# Patient Record
Sex: FEMALE | Race: WHITE | Employment: FULL TIME | URBAN - METROPOLITAN AREA
[De-identification: names, ages, dates, MRNs, and addresses within clinical notes are randomized per-mention and may not be internally consistent; named-entity substitution may affect disease eponyms.]

---

## 2017-03-22 ENCOUNTER — PATIENT MESSAGE (OUTPATIENT)
Dept: FAMILY MEDICINE CLINIC | Facility: CLINIC | Age: 37
End: 2017-03-22

## 2017-03-23 NOTE — TELEPHONE ENCOUNTER
Please see above message. Patient still has a valid ultrasound order from 10/4/16. Please advise. Thank you!

## 2017-03-23 NOTE — TELEPHONE ENCOUNTER
Please call this patient and find out more details. How much is she bleeding?  If this is severe, she needs to go to the ER for immediate eval. If it is just a heavy period, then we can refill her naproxen to take as needed for cramps and have her do US aft

## 2017-03-23 NOTE — TELEPHONE ENCOUNTER
From: Darius Botello  To: Socorro Mccollum MD  Sent: 3/22/2017 9:05 AM CDT  Subject: Other    I am hemorrhaging again. I started my menstrual yesterday.  I am nauseous and my anxiety is higher than normal. Cramps were so severe that I couldn't sleep teresa

## 2018-08-31 ENCOUNTER — OFFICE VISIT (OUTPATIENT)
Dept: FAMILY MEDICINE CLINIC | Facility: CLINIC | Age: 38
End: 2018-08-31
Payer: COMMERCIAL

## 2018-08-31 VITALS
BODY MASS INDEX: 30.86 KG/M2 | TEMPERATURE: 99 F | HEIGHT: 66 IN | OXYGEN SATURATION: 99 % | SYSTOLIC BLOOD PRESSURE: 116 MMHG | DIASTOLIC BLOOD PRESSURE: 70 MMHG | WEIGHT: 192 LBS | RESPIRATION RATE: 18 BRPM | HEART RATE: 68 BPM

## 2018-08-31 DIAGNOSIS — Z13.29 SCREENING FOR ENDOCRINE, NUTRITIONAL, METABOLIC AND IMMUNITY DISORDER: ICD-10-CM

## 2018-08-31 DIAGNOSIS — Z13.0 SCREENING FOR ENDOCRINE, NUTRITIONAL, METABOLIC AND IMMUNITY DISORDER: ICD-10-CM

## 2018-08-31 DIAGNOSIS — Z13.228 SCREENING FOR ENDOCRINE, NUTRITIONAL, METABOLIC AND IMMUNITY DISORDER: ICD-10-CM

## 2018-08-31 DIAGNOSIS — Z00.00 ROUTINE GENERAL MEDICAL EXAMINATION AT A HEALTH CARE FACILITY: Primary | ICD-10-CM

## 2018-08-31 DIAGNOSIS — Z13.21 SCREENING FOR ENDOCRINE, NUTRITIONAL, METABOLIC AND IMMUNITY DISORDER: ICD-10-CM

## 2018-08-31 DIAGNOSIS — F41.8 ANXIETY ASSOCIATED WITH DEPRESSION: ICD-10-CM

## 2018-08-31 PROCEDURE — 99395 PREV VISIT EST AGE 18-39: CPT | Performed by: FAMILY MEDICINE

## 2018-08-31 RX ORDER — CITALOPRAM 10 MG/1
10 TABLET ORAL DAILY
Qty: 90 TABLET | Refills: 3 | Status: SHIPPED | OUTPATIENT
Start: 2018-08-31 | End: 2019-01-17 | Stop reason: ALTCHOICE

## 2018-08-31 NOTE — PROGRESS NOTES
Kwesi Benson is a 40year old female who presents for a complete physical exam, no gyn. HPI:     Patient presents with:  Physical      Patient feels well, dental visit up to date, no hearing problem. Vaccinations up to date.     Exercise: walking, or motor complaint  HEMATOLOGY: denies hx anemia; denies bruising or excessive bleeding  ENDOCRINE: denies excessive thirst or urination; denies unexpected wt gain or wt loss  ALLERGY/IMM.: denies food or seasonal allergies  PSYCH: no symptoms of depressio plan.  The patient is asked to return for CPX in 1 year.

## 2018-08-31 NOTE — PATIENT INSTRUCTIONS
Prevention Guidelines, Women Ages 25 to 44  Screening tests and vaccines are an important part of managing your health. A screening test is done to find possible disorders or diseases in people who don't have any symptoms.  The goal is to find a disease e Type 2 diabetes All women with prediabetes Every year   Gonorrhea Sexually active women at increased risk for infection At routine exams   Hepatitis C Anyone at increased risk At routine exams   HIV All women should be tested at least once for HIV between Meningococcal Women at increased risk for infection should talk with their healthcare provider 1 or more doses   Pneumococcal conjugate vaccine (PCV13) and pneumococcal polysaccharide vaccine (PPSV23) Women at increased risk for infection should talk with © 0227-0487 [employer] [address]. [address] [facility] [place], [facility] [place]. All rights reserved. This information is not intended as a substitute for professional medical care. Always follow your healthcare professional's instructions.

## 2018-09-27 ENCOUNTER — LAB ENCOUNTER (OUTPATIENT)
Dept: LAB | Age: 38
End: 2018-09-27
Attending: FAMILY MEDICINE
Payer: COMMERCIAL

## 2018-09-27 DIAGNOSIS — Z13.21 SCREENING FOR ENDOCRINE, NUTRITIONAL, METABOLIC AND IMMUNITY DISORDER: ICD-10-CM

## 2018-09-27 DIAGNOSIS — Z00.00 ROUTINE GENERAL MEDICAL EXAMINATION AT A HEALTH CARE FACILITY: ICD-10-CM

## 2018-09-27 DIAGNOSIS — Z13.0 SCREENING FOR ENDOCRINE, NUTRITIONAL, METABOLIC AND IMMUNITY DISORDER: ICD-10-CM

## 2018-09-27 DIAGNOSIS — Z13.228 SCREENING FOR ENDOCRINE, NUTRITIONAL, METABOLIC AND IMMUNITY DISORDER: ICD-10-CM

## 2018-09-27 DIAGNOSIS — Z13.29 SCREENING FOR ENDOCRINE, NUTRITIONAL, METABOLIC AND IMMUNITY DISORDER: ICD-10-CM

## 2018-09-27 LAB
ALBUMIN SERPL-MCNC: 3.8 G/DL (ref 3.5–4.8)
ALBUMIN/GLOB SERPL: 1 {RATIO} (ref 1–2)
ALP LIVER SERPL-CCNC: 58 U/L (ref 37–98)
ALT SERPL-CCNC: 31 U/L (ref 14–54)
ANION GAP SERPL CALC-SCNC: 5 MMOL/L (ref 0–18)
AST SERPL-CCNC: 21 U/L (ref 15–41)
BASOPHILS # BLD AUTO: 0.06 X10(3) UL (ref 0–0.1)
BASOPHILS NFR BLD AUTO: 0.9 %
BILIRUB SERPL-MCNC: 0.6 MG/DL (ref 0.1–2)
BUN BLD-MCNC: 14 MG/DL (ref 8–20)
BUN/CREAT SERPL: 21.9 (ref 10–20)
CALCIUM BLD-MCNC: 8.8 MG/DL (ref 8.3–10.3)
CHLORIDE SERPL-SCNC: 105 MMOL/L (ref 101–111)
CHOLEST SMN-MCNC: 163 MG/DL (ref ?–200)
CO2 SERPL-SCNC: 28 MMOL/L (ref 22–32)
CREAT BLD-MCNC: 0.64 MG/DL (ref 0.55–1.02)
EOSINOPHIL # BLD AUTO: 0.36 X10(3) UL (ref 0–0.3)
EOSINOPHIL NFR BLD AUTO: 5.5 %
ERYTHROCYTE [DISTWIDTH] IN BLOOD BY AUTOMATED COUNT: 14.2 % (ref 11.5–16)
GLOBULIN PLAS-MCNC: 4 G/DL (ref 2.5–4)
GLUCOSE BLD-MCNC: 87 MG/DL (ref 70–99)
HCT VFR BLD AUTO: 35.3 % (ref 34–50)
HDLC SERPL-MCNC: 60 MG/DL (ref 40–59)
HGB BLD-MCNC: 10.9 G/DL (ref 12–16)
IMMATURE GRANULOCYTE COUNT: 0.01 X10(3) UL (ref 0–1)
IMMATURE GRANULOCYTE RATIO %: 0.2 %
LDLC SERPL CALC-MCNC: 93 MG/DL (ref ?–100)
LYMPHOCYTES # BLD AUTO: 2.01 X10(3) UL (ref 0.9–4)
LYMPHOCYTES NFR BLD AUTO: 30.5 %
M PROTEIN MFR SERPL ELPH: 7.8 G/DL (ref 6.1–8.3)
MCH RBC QN AUTO: 26.3 PG (ref 27–33.2)
MCHC RBC AUTO-ENTMCNC: 30.9 G/DL (ref 31–37)
MCV RBC AUTO: 85.1 FL (ref 81–100)
MONOCYTES # BLD AUTO: 0.52 X10(3) UL (ref 0.1–1)
MONOCYTES NFR BLD AUTO: 7.9 %
NEUTROPHIL ABS PRELIM: 3.64 X10 (3) UL (ref 1.3–6.7)
NEUTROPHILS # BLD AUTO: 3.64 X10(3) UL (ref 1.3–6.7)
NEUTROPHILS NFR BLD AUTO: 55 %
NONHDLC SERPL-MCNC: 103 MG/DL (ref ?–130)
OSMOLALITY SERPL CALC.SUM OF ELEC: 286 MOSM/KG (ref 275–295)
PLATELET # BLD AUTO: 433 10(3)UL (ref 150–450)
POTASSIUM SERPL-SCNC: 4.1 MMOL/L (ref 3.6–5.1)
RBC # BLD AUTO: 4.15 X10(6)UL (ref 3.8–5.1)
RED CELL DISTRIBUTION WIDTH-SD: 44.2 FL (ref 35.1–46.3)
SODIUM SERPL-SCNC: 138 MMOL/L (ref 136–144)
TRIGL SERPL-MCNC: 52 MG/DL (ref 30–149)
TSI SER-ACNC: 0.59 MIU/ML (ref 0.35–5.5)
VLDLC SERPL CALC-MCNC: 10 MG/DL (ref 0–30)
WBC # BLD AUTO: 6.6 X10(3) UL (ref 4–13)

## 2018-09-27 PROCEDURE — 80050 GENERAL HEALTH PANEL: CPT | Performed by: FAMILY MEDICINE

## 2018-09-27 PROCEDURE — 36415 COLL VENOUS BLD VENIPUNCTURE: CPT | Performed by: FAMILY MEDICINE

## 2018-09-27 PROCEDURE — 80061 LIPID PANEL: CPT | Performed by: FAMILY MEDICINE

## 2018-09-28 ENCOUNTER — TELEPHONE (OUTPATIENT)
Dept: FAMILY MEDICINE CLINIC | Facility: CLINIC | Age: 38
End: 2018-09-28

## 2018-09-28 DIAGNOSIS — N92.0 MENORRHAGIA WITH REGULAR CYCLE: Primary | ICD-10-CM

## 2018-09-28 NOTE — TELEPHONE ENCOUNTER
Patient aware of US and lab orders. She is requesting HIV lab test only. Advised to have blood work done in AM. She will follow up after testing is complete. All orders placed.

## 2018-09-28 NOTE — TELEPHONE ENCOUNTER
----- Message from Shwetha Davies MD sent at 9/28/2018 12:04 PM CDT -----  Anemia, heavy periods?  If not we need fit test.

## 2018-09-28 NOTE — TELEPHONE ENCOUNTER
Spoke with patient. She says her period is generally pretty light, however, she 1 day where it is very heavy-so heavy that she has to change her pants. This happens every period and has been going on for a couple of years.  She brought this up before with A

## 2018-09-28 NOTE — TELEPHONE ENCOUNTER
FSH,LH, progestin morning labs. US gyn for menorrhagia. Then will f/u. Thanks. Ok for HIV or all STD panel ask if she is interested for all STD panel.

## 2018-10-25 ENCOUNTER — APPOINTMENT (OUTPATIENT)
Dept: LAB | Age: 38
End: 2018-10-25
Attending: FAMILY MEDICINE
Payer: COMMERCIAL

## 2018-10-25 DIAGNOSIS — N92.0 MENORRHAGIA WITH REGULAR CYCLE: ICD-10-CM

## 2018-10-25 PROCEDURE — 36415 COLL VENOUS BLD VENIPUNCTURE: CPT | Performed by: FAMILY MEDICINE

## 2018-10-25 PROCEDURE — 83002 ASSAY OF GONADOTROPIN (LH): CPT | Performed by: FAMILY MEDICINE

## 2018-10-25 PROCEDURE — 84144 ASSAY OF PROGESTERONE: CPT | Performed by: FAMILY MEDICINE

## 2018-10-25 PROCEDURE — 83001 ASSAY OF GONADOTROPIN (FSH): CPT | Performed by: FAMILY MEDICINE

## 2018-10-25 PROCEDURE — 87389 HIV-1 AG W/HIV-1&-2 AB AG IA: CPT | Performed by: FAMILY MEDICINE

## 2018-10-26 ENCOUNTER — PATIENT MESSAGE (OUTPATIENT)
Dept: FAMILY MEDICINE CLINIC | Facility: CLINIC | Age: 38
End: 2018-10-26

## 2018-10-29 NOTE — TELEPHONE ENCOUNTER
From: Wen Botello  To: Nohemy Cervantes MD  Sent: 10/26/2018 5:28 PM CDT  Subject: Test Results Question    Which test is the results for the HIV test I requested? ?

## 2019-01-10 ENCOUNTER — HOSPITAL ENCOUNTER (OUTPATIENT)
Dept: ULTRASOUND IMAGING | Age: 39
Discharge: HOME | End: 2019-01-10
Attending: FAMILY MEDICINE
Payer: COMMERCIAL

## 2019-01-10 DIAGNOSIS — N92.0 MENORRHAGIA WITH REGULAR CYCLE: ICD-10-CM

## 2019-01-10 PROCEDURE — 76830 TRANSVAGINAL US NON-OB: CPT | Performed by: FAMILY MEDICINE

## 2019-01-10 PROCEDURE — 76856 US EXAM PELVIC COMPLETE: CPT | Performed by: FAMILY MEDICINE

## 2019-01-11 ENCOUNTER — TELEPHONE (OUTPATIENT)
Dept: FAMILY MEDICINE CLINIC | Facility: CLINIC | Age: 39
End: 2019-01-11

## 2019-01-11 NOTE — TELEPHONE ENCOUNTER
----- Message from Rashida Magallanes MD sent at 1/10/2019  7:54 PM CST -----  US PELVIS EV (TRNS ABD AND ENDOVAG)   Ovarian cyst, ok to f/u wit me for further discussion.

## 2019-01-11 NOTE — TELEPHONE ENCOUNTER
Spoke with pt regarding results and instructions listed below. Pt verbalizes understanding. FYI: US F/U scheduled with PCP 1/17/2019. No significant past surgical history

## 2019-01-17 ENCOUNTER — OFFICE VISIT (OUTPATIENT)
Dept: FAMILY MEDICINE CLINIC | Facility: CLINIC | Age: 39
End: 2019-01-17
Payer: COMMERCIAL

## 2019-01-17 VITALS
RESPIRATION RATE: 20 BRPM | BODY MASS INDEX: 29.89 KG/M2 | SYSTOLIC BLOOD PRESSURE: 116 MMHG | TEMPERATURE: 98 F | HEART RATE: 80 BPM | DIASTOLIC BLOOD PRESSURE: 70 MMHG | WEIGHT: 186 LBS | HEIGHT: 66 IN | OXYGEN SATURATION: 99 %

## 2019-01-17 DIAGNOSIS — L70.0 ACNE VULGARIS: ICD-10-CM

## 2019-01-17 DIAGNOSIS — Z23 NEED FOR INFLUENZA VACCINATION: Primary | ICD-10-CM

## 2019-01-17 DIAGNOSIS — N83.202 CYST OF LEFT OVARY: ICD-10-CM

## 2019-01-17 PROCEDURE — 99214 OFFICE O/P EST MOD 30 MIN: CPT | Performed by: FAMILY MEDICINE

## 2019-01-17 PROCEDURE — 90471 IMMUNIZATION ADMIN: CPT | Performed by: FAMILY MEDICINE

## 2019-01-17 PROCEDURE — 90686 IIV4 VACC NO PRSV 0.5 ML IM: CPT | Performed by: FAMILY MEDICINE

## 2019-01-17 RX ORDER — LEVONORGESTREL / ETHINYL ESTRADIOL AND ETHINYL ESTRADIOL 150-30(84)
1 KIT ORAL DAILY
Qty: 91 TABLET | Refills: 3 | Status: SHIPPED | OUTPATIENT
Start: 2019-01-17 | End: 2019-04-18

## 2019-01-17 RX ORDER — CLINDAMYCIN PHOSPHATE 10 MG/G
1 GEL TOPICAL 2 TIMES DAILY
Qty: 45 G | Refills: 3 | Status: SHIPPED | OUTPATIENT
Start: 2019-01-17 | End: 2019-04-18 | Stop reason: ALTCHOICE

## 2019-01-17 NOTE — PROGRESS NOTES
Stanley Lamas is a 41year old female who presents for abdominal pain. HPI:  The patient complaints of pelvic pain, dull, worse around the period, no radiation, come and go, mild to moderate, s/p tubal ligation, some acne, no h./o PCOS.  worsened b • Other (Other) Maternal Grandfather    • Other (liver dz) Maternal Grandfather       Social History:  Social History    Tobacco Use      Smoking status: Never Smoker      Smokeless tobacco: Never Used    Alcohol use: No    Drug use: No        REVIEW OF the plan. F/u in 3 months.

## 2019-04-18 ENCOUNTER — OFFICE VISIT (OUTPATIENT)
Dept: FAMILY MEDICINE CLINIC | Facility: CLINIC | Age: 39
End: 2019-04-18
Payer: COMMERCIAL

## 2019-04-18 VITALS
RESPIRATION RATE: 20 BRPM | HEART RATE: 68 BPM | TEMPERATURE: 99 F | DIASTOLIC BLOOD PRESSURE: 68 MMHG | SYSTOLIC BLOOD PRESSURE: 114 MMHG | OXYGEN SATURATION: 99 % | HEIGHT: 66 IN | WEIGHT: 186 LBS | BODY MASS INDEX: 29.89 KG/M2

## 2019-04-18 DIAGNOSIS — N93.8 DUB (DYSFUNCTIONAL UTERINE BLEEDING): Primary | ICD-10-CM

## 2019-04-18 PROCEDURE — 99214 OFFICE O/P EST MOD 30 MIN: CPT | Performed by: FAMILY MEDICINE

## 2019-04-18 NOTE — PATIENT INSTRUCTIONS
Kanchan Goodman MD  Obstetrics/Gynecology     [address] [facility]  Phone: [phone]  Fax: [phone] [address]  Phone: [phone]  Fax: [phone]

## 2019-04-18 NOTE — PROGRESS NOTES
Lisa Tomlinson is a 41year old female who presents for abnormal uterine bleeding. HPI:  The patient complaints of pelvic pain, dull, worse around the period, no radiation, come and go, mild to moderate, s/p tubal ligation, some acne, no h./o PCOS. History:  Social History    Tobacco Use      Smoking status: Never Smoker      Smokeless tobacco: Never Used    Alcohol use: No    Drug use: No        REVIEW OF SYSTEMS:   GENERAL: feels tired, no lethargy, no fever, no chills  SKIN: denies any unusual ski

## 2019-06-17 ENCOUNTER — TELEPHONE (OUTPATIENT)
Dept: FAMILY MEDICINE CLINIC | Facility: CLINIC | Age: 39
End: 2019-06-17

## 2019-06-17 NOTE — TELEPHONE ENCOUNTER
Pt states that the ob gyn that was referred is book out and then going on maternity leave. She would like another ob gyn.

## 2019-06-17 NOTE — TELEPHONE ENCOUNTER
Dr. Berta Guerra. [address] [facility] [address]  [place], [address]  [phone]    [address] [facility]  [address]  [place], [id number]  Phone: [phone] [place] [address] [employer], [address]

## 2019-06-17 NOTE — TELEPHONE ENCOUNTER
Called patient. I offered her referral but she is wanting female only. I gave her contact info for  and JOHAN OBLILY.

## 2019-06-17 NOTE — TELEPHONE ENCOUNTER
Pt is asking for alternate referral for OB/GYN, Dr. Neeta Deras is going out on maternity leave.   LOV 4/18

## 2019-12-19 ENCOUNTER — LAB ENCOUNTER (OUTPATIENT)
Dept: LAB | Age: 39
End: 2019-12-19
Attending: FAMILY MEDICINE
Payer: COMMERCIAL

## 2019-12-19 ENCOUNTER — OFFICE VISIT (OUTPATIENT)
Dept: FAMILY MEDICINE CLINIC | Facility: CLINIC | Age: 39
End: 2019-12-19
Payer: COMMERCIAL

## 2019-12-19 VITALS
BODY MASS INDEX: 28.61 KG/M2 | HEART RATE: 52 BPM | OXYGEN SATURATION: 98 % | HEIGHT: 66 IN | WEIGHT: 178 LBS | TEMPERATURE: 98 F | DIASTOLIC BLOOD PRESSURE: 58 MMHG | SYSTOLIC BLOOD PRESSURE: 106 MMHG | RESPIRATION RATE: 18 BRPM

## 2019-12-19 DIAGNOSIS — Z13.228 SCREENING FOR ENDOCRINE, NUTRITIONAL, METABOLIC AND IMMUNITY DISORDER: ICD-10-CM

## 2019-12-19 DIAGNOSIS — Z13.29 SCREENING FOR ENDOCRINE, NUTRITIONAL, METABOLIC AND IMMUNITY DISORDER: ICD-10-CM

## 2019-12-19 DIAGNOSIS — Z11.3 SCREENING EXAMINATION FOR STD (SEXUALLY TRANSMITTED DISEASE): ICD-10-CM

## 2019-12-19 DIAGNOSIS — Z01.419 WELL WOMAN EXAM WITH ROUTINE GYNECOLOGICAL EXAM: Primary | ICD-10-CM

## 2019-12-19 DIAGNOSIS — Z11.8 SCREENING FOR CHLAMYDIAL DISEASE: ICD-10-CM

## 2019-12-19 DIAGNOSIS — Z13.21 SCREENING FOR ENDOCRINE, NUTRITIONAL, METABOLIC AND IMMUNITY DISORDER: ICD-10-CM

## 2019-12-19 DIAGNOSIS — Z13.0 SCREENING FOR ENDOCRINE, NUTRITIONAL, METABOLIC AND IMMUNITY DISORDER: ICD-10-CM

## 2019-12-19 DIAGNOSIS — Z12.4 SCREENING FOR CERVICAL CANCER: ICD-10-CM

## 2019-12-19 PROCEDURE — 86780 TREPONEMA PALLIDUM: CPT | Performed by: FAMILY MEDICINE

## 2019-12-19 PROCEDURE — 86704 HEP B CORE ANTIBODY TOTAL: CPT | Performed by: FAMILY MEDICINE

## 2019-12-19 PROCEDURE — 84439 ASSAY OF FREE THYROXINE: CPT | Performed by: FAMILY MEDICINE

## 2019-12-19 PROCEDURE — 87591 N.GONORRHOEAE DNA AMP PROB: CPT | Performed by: FAMILY MEDICINE

## 2019-12-19 PROCEDURE — 87624 HPV HI-RISK TYP POOLED RSLT: CPT | Performed by: FAMILY MEDICINE

## 2019-12-19 PROCEDURE — 99395 PREV VISIT EST AGE 18-39: CPT | Performed by: FAMILY MEDICINE

## 2019-12-19 PROCEDURE — 80050 GENERAL HEALTH PANEL: CPT | Performed by: FAMILY MEDICINE

## 2019-12-19 PROCEDURE — 86706 HEP B SURFACE ANTIBODY: CPT | Performed by: FAMILY MEDICINE

## 2019-12-19 PROCEDURE — 36415 COLL VENOUS BLD VENIPUNCTURE: CPT | Performed by: FAMILY MEDICINE

## 2019-12-19 PROCEDURE — 87389 HIV-1 AG W/HIV-1&-2 AB AG IA: CPT | Performed by: FAMILY MEDICINE

## 2019-12-19 PROCEDURE — 86803 HEPATITIS C AB TEST: CPT | Performed by: FAMILY MEDICINE

## 2019-12-19 PROCEDURE — 86695 HERPES SIMPLEX TYPE 1 TEST: CPT | Performed by: FAMILY MEDICINE

## 2019-12-19 PROCEDURE — 82306 VITAMIN D 25 HYDROXY: CPT | Performed by: FAMILY MEDICINE

## 2019-12-19 PROCEDURE — 87491 CHLMYD TRACH DNA AMP PROBE: CPT | Performed by: FAMILY MEDICINE

## 2019-12-19 PROCEDURE — 87340 HEPATITIS B SURFACE AG IA: CPT | Performed by: FAMILY MEDICINE

## 2019-12-19 NOTE — PROGRESS NOTES
Larri Habermann is a 46year old female who presents for a complete physical exam.   HPI:     Patient presents with:  Physical      Patient feels well, dental visit up to date, no hearing problem. Vaccinations up to date.   [id number], natural delivery t • Other (anxiety) Mother    • Other (depression) Mother    • Heart Disorder Maternal Grandmother    • Other (Other) Maternal Grandfather    • Other (liver dz) Maternal Grandfather       Social History    Tobacco Use      Smoking status: Never Smoker placed and vaginal wall visualizes without abnormalities and Liquid Based PAP performed. Cervix is normal, non-friable, with a closed OS and no abnormal D/C. Bimanual exam shows no CMT or adenexal masses or tenderness.   Rectal exam: has normal tone, no ma

## 2019-12-23 ENCOUNTER — TELEPHONE (OUTPATIENT)
Dept: FAMILY MEDICINE CLINIC | Facility: CLINIC | Age: 39
End: 2019-12-23

## 2019-12-23 ENCOUNTER — PATIENT MESSAGE (OUTPATIENT)
Dept: FAMILY MEDICINE CLINIC | Facility: CLINIC | Age: 39
End: 2019-12-23

## 2019-12-23 RX ORDER — ERGOCALCIFEROL 1.25 MG/1
CAPSULE ORAL
Qty: 4 CAPSULE | Refills: 3 | Status: SHIPPED | OUTPATIENT
Start: 2019-12-23 | End: 2020-03-07 | Stop reason: ALTCHOICE

## 2019-12-23 NOTE — TELEPHONE ENCOUNTER
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

## 2019-12-23 NOTE — TELEPHONE ENCOUNTER
----- Message from Gordy Morrison MD sent at 12/20/2019 10:38 AM CST -----  Needs hep B series. 1-1-6. Not immune. Low vit. D, Rx 32831G for 4 months, weekly please. lmom for pt with results/instructions.   Asked her to call office to schedule the nurs

## 2019-12-26 ENCOUNTER — TELEPHONE (OUTPATIENT)
Dept: FAMILY MEDICINE CLINIC | Facility: CLINIC | Age: 39
End: 2019-12-26

## 2019-12-26 RX ORDER — CLINDAMYCIN PHOSPHATE 20 MG/G
CREAM VAGINAL
Qty: 40 G | Refills: 0 | Status: SHIPPED | OUTPATIENT
Start: 2019-12-26 | End: 2020-03-07 | Stop reason: ALTCHOICE

## 2019-12-26 NOTE — TELEPHONE ENCOUNTER
----- Message from Katie Amos MD sent at 12/26/2019  8:10 AM CST -----  Please send Clindamycin 2% PV for 5 nights one applications for positive BV. Normal Pap.     Spoke to pt with results/instructions and med sent to rx

## 2020-03-04 ENCOUNTER — NURSE ONLY (OUTPATIENT)
Dept: FAMILY MEDICINE CLINIC | Facility: CLINIC | Age: 40
End: 2020-03-04
Payer: COMMERCIAL

## 2020-03-04 DIAGNOSIS — Z23 NEED FOR HEPATITIS B VACCINATION: Primary | ICD-10-CM

## 2020-03-04 PROCEDURE — 90471 IMMUNIZATION ADMIN: CPT | Performed by: NURSE PRACTITIONER

## 2020-03-04 PROCEDURE — 90746 HEPB VACCINE 3 DOSE ADULT IM: CPT | Performed by: NURSE PRACTITIONER

## 2020-03-05 ENCOUNTER — PATIENT MESSAGE (OUTPATIENT)
Dept: FAMILY MEDICINE CLINIC | Facility: CLINIC | Age: 40
End: 2020-03-05

## 2020-03-06 NOTE — TELEPHONE ENCOUNTER
From: Hayes Botello  To: Cadence Carpenter MD  Sent: 3/5/2020 8:53 PM CST  Subject: Non-Urgent Medical Question    On Tuesday March 3, 2020 I had a blood clot and thought my period was starting. Another blood clot came out before bed.  Yesterday March 4,

## 2020-03-07 ENCOUNTER — LAB ENCOUNTER (OUTPATIENT)
Dept: LAB | Age: 40
End: 2020-03-07
Attending: FAMILY MEDICINE
Payer: COMMERCIAL

## 2020-03-07 ENCOUNTER — OFFICE VISIT (OUTPATIENT)
Dept: FAMILY MEDICINE CLINIC | Facility: CLINIC | Age: 40
End: 2020-03-07
Payer: COMMERCIAL

## 2020-03-07 VITALS
DIASTOLIC BLOOD PRESSURE: 68 MMHG | TEMPERATURE: 99 F | HEIGHT: 66 IN | OXYGEN SATURATION: 100 % | BODY MASS INDEX: 28.61 KG/M2 | WEIGHT: 178 LBS | HEART RATE: 62 BPM | RESPIRATION RATE: 18 BRPM | SYSTOLIC BLOOD PRESSURE: 112 MMHG

## 2020-03-07 DIAGNOSIS — Z13.228 SCREENING FOR ENDOCRINE, NUTRITIONAL, METABOLIC AND IMMUNITY DISORDER: ICD-10-CM

## 2020-03-07 DIAGNOSIS — Z13.21 SCREENING FOR ENDOCRINE, NUTRITIONAL, METABOLIC AND IMMUNITY DISORDER: ICD-10-CM

## 2020-03-07 DIAGNOSIS — Z13.29 SCREENING FOR ENDOCRINE, NUTRITIONAL, METABOLIC AND IMMUNITY DISORDER: ICD-10-CM

## 2020-03-07 DIAGNOSIS — N92.0 MENORRHAGIA WITH REGULAR CYCLE: ICD-10-CM

## 2020-03-07 DIAGNOSIS — Z13.0 SCREENING FOR ENDOCRINE, NUTRITIONAL, METABOLIC AND IMMUNITY DISORDER: ICD-10-CM

## 2020-03-07 DIAGNOSIS — N92.0 MENORRHAGIA WITH REGULAR CYCLE: Primary | ICD-10-CM

## 2020-03-07 LAB
B-HCG SERPL-ACNC: <1 MIU/ML
BASOPHILS # BLD AUTO: 0.06 X10(3) UL (ref 0–0.2)
BASOPHILS NFR BLD AUTO: 0.9 %
CHOLEST SMN-MCNC: 173 MG/DL (ref ?–200)
DEPRECATED RDW RBC AUTO: 43.8 FL (ref 35.1–46.3)
EOSINOPHIL # BLD AUTO: 0.37 X10(3) UL (ref 0–0.7)
EOSINOPHIL NFR BLD AUTO: 5.8 %
ERYTHROCYTE [DISTWIDTH] IN BLOOD BY AUTOMATED COUNT: 14.3 % (ref 11–15)
FSH SERPL-ACNC: 5 MIU/ML
HCT VFR BLD AUTO: 34.4 % (ref 35–48)
HDLC SERPL-MCNC: 79 MG/DL (ref 40–59)
HGB BLD-MCNC: 10.4 G/DL (ref 12–16)
IMM GRANULOCYTES # BLD AUTO: 0.01 X10(3) UL (ref 0–1)
IMM GRANULOCYTES NFR BLD: 0.2 %
LDLC SERPL CALC-MCNC: 85 MG/DL (ref ?–100)
LH SERPL-ACNC: 4.1 MIU/ML
LYMPHOCYTES # BLD AUTO: 1.88 X10(3) UL (ref 1–4)
LYMPHOCYTES NFR BLD AUTO: 29.5 %
MCH RBC QN AUTO: 25.3 PG (ref 26–34)
MCHC RBC AUTO-ENTMCNC: 30.2 G/DL (ref 31–37)
MCV RBC AUTO: 83.7 FL (ref 80–100)
MONOCYTES # BLD AUTO: 0.64 X10(3) UL (ref 0.1–1)
MONOCYTES NFR BLD AUTO: 10 %
NEUTROPHILS # BLD AUTO: 3.41 X10 (3) UL (ref 1.5–7.7)
NEUTROPHILS # BLD AUTO: 3.41 X10(3) UL (ref 1.5–7.7)
NEUTROPHILS NFR BLD AUTO: 53.6 %
NONHDLC SERPL-MCNC: 94 MG/DL (ref ?–130)
PATIENT FASTING Y/N/NP: YES
PLATELET # BLD AUTO: 385 10(3)UL (ref 150–450)
RBC # BLD AUTO: 4.11 X10(6)UL (ref 3.8–5.3)
TRIGL SERPL-MCNC: 46 MG/DL (ref 30–149)
TSI SER-ACNC: 0.88 MIU/ML (ref 0.36–3.74)
VLDLC SERPL CALC-MCNC: 9 MG/DL (ref 0–30)
WBC # BLD AUTO: 6.4 X10(3) UL (ref 4–11)

## 2020-03-07 PROCEDURE — 83002 ASSAY OF GONADOTROPIN (LH): CPT | Performed by: FAMILY MEDICINE

## 2020-03-07 PROCEDURE — 83001 ASSAY OF GONADOTROPIN (FSH): CPT | Performed by: FAMILY MEDICINE

## 2020-03-07 PROCEDURE — 36415 COLL VENOUS BLD VENIPUNCTURE: CPT | Performed by: FAMILY MEDICINE

## 2020-03-07 PROCEDURE — 84443 ASSAY THYROID STIM HORMONE: CPT | Performed by: FAMILY MEDICINE

## 2020-03-07 PROCEDURE — 85025 COMPLETE CBC W/AUTO DIFF WBC: CPT | Performed by: FAMILY MEDICINE

## 2020-03-07 PROCEDURE — 84702 CHORIONIC GONADOTROPIN TEST: CPT | Performed by: FAMILY MEDICINE

## 2020-03-07 PROCEDURE — 80061 LIPID PANEL: CPT | Performed by: FAMILY MEDICINE

## 2020-03-07 PROCEDURE — 99214 OFFICE O/P EST MOD 30 MIN: CPT | Performed by: FAMILY MEDICINE

## 2020-03-07 NOTE — PROGRESS NOTES
Sakshi Reyes is a 46year old female who presents for abnormal uterine bleeding. HPI:  The patient complaints of abnormal uterine bleeding. Pt has prolonged periods for 1 cycle. Started suddenly. Worsened by activites. Better by rest. No prior pro No        REVIEW OF SYSTEMS:   GENERAL: feels tired, no lethargy, no fever, no chills  SKIN: denies any unusual skin lesions, rash. LUNGS: denies shortness of breath with exertion  CARDIOVASCULAR: denies chest pain on exertion  GI: as above. No heartburn. Yue Gold

## 2020-03-07 NOTE — PATIENT INSTRUCTIONS
Heavy Menstrual Bleeding    Heavy menstrual bleeding means that your periods are heavier or longer than usual. You may soak through a pad or tampon every 1 to 3 hours on the heaviest days of your period. You may also pass large, dark clots. And your alverto · Heavier bleeding (soaking 1 pad or tampon every hour for 3 hours)  · Heavy bleeding that lasts longer than 1 week  · Fever of 100.4ºF (38ºC) or higher, or as directed by your provider  · Pain or cramping that gets worse instead of better  · Signs of anem Other causes of uterine bleeding  There are other causes of uterine bleeding. These include:  · IUD (intrauterine device). This is a method of birth control. Some IUDs contain hormones. · Bleeding disorders. This is when the blood can't clot properly.   Denny Vargas

## 2020-03-11 ENCOUNTER — HOSPITAL ENCOUNTER (OUTPATIENT)
Dept: ULTRASOUND IMAGING | Age: 40
Discharge: HOME | End: 2020-03-11
Attending: FAMILY MEDICINE
Payer: COMMERCIAL

## 2020-03-11 DIAGNOSIS — N92.0 MENORRHAGIA WITH REGULAR CYCLE: ICD-10-CM

## 2020-03-11 PROCEDURE — 76830 TRANSVAGINAL US NON-OB: CPT | Performed by: FAMILY MEDICINE

## 2020-03-11 PROCEDURE — 76856 US EXAM PELVIC COMPLETE: CPT | Performed by: FAMILY MEDICINE

## 2020-03-17 NOTE — PROGRESS NOTES
[facility]  Obstetrics and Gynecology Referral  History & Physical    CC: Patient is a new patient and referred for hx of ovarian cyst and nabothian cyst    Subjective:     HPI: Jonathan Pérez is a 46year old  female referred for hx of Not on file    Social Needs      Financial resource strain: Not on file      Food insecurity:        Worry: Not on file        Inability: Not on file      Transportation needs:        Medical: Not on file        Non-medical: Not on file    Tobacco Use Disorder Maternal Grandmother    • Other (Other) Maternal Grandfather    • Other (liver dz) Maternal Grandfather        Review of Systems:  General: denies fevers, chills, fatigue and malaise  Respiratory:  denies SOB, dyspnea, cough or wheezing  Cardiovas 01/2019  FINDINGS:             TRANSABDOMINAL ULTRASOUND:  UTERUS: The uterus measures 10.4 x 6.3 x 4.2 cm. The endometrium appears unremarkable on transabdominal images. Complex nabothian cyst.  RIGHT OVARY: Right ovary measures 3.2 x 1.3 x 1.6 cm.    LEF

## 2020-03-18 ENCOUNTER — OFFICE VISIT (OUTPATIENT)
Dept: OBGYN CLINIC | Facility: CLINIC | Age: 40
End: 2020-03-18
Payer: COMMERCIAL

## 2020-03-18 VITALS
BODY MASS INDEX: 30.02 KG/M2 | WEIGHT: 186.81 LBS | DIASTOLIC BLOOD PRESSURE: 70 MMHG | SYSTOLIC BLOOD PRESSURE: 122 MMHG | HEIGHT: 66 IN | HEART RATE: 58 BPM

## 2020-03-18 DIAGNOSIS — N88.8 NABOTHIAN CYST: Primary | ICD-10-CM

## 2020-03-18 PROCEDURE — 99203 OFFICE O/P NEW LOW 30 MIN: CPT | Performed by: OBSTETRICS & GYNECOLOGY

## 2020-03-19 PROBLEM — N88.8 NABOTHIAN CYST: Status: ACTIVE | Noted: 2020-03-19

## 2020-11-05 ENCOUNTER — OFFICE VISIT (OUTPATIENT)
Dept: FAMILY MEDICINE CLINIC | Facility: CLINIC | Age: 40
End: 2020-11-05
Payer: COMMERCIAL

## 2020-11-05 VITALS
RESPIRATION RATE: 18 BRPM | WEIGHT: 201 LBS | HEART RATE: 80 BPM | OXYGEN SATURATION: 99 % | DIASTOLIC BLOOD PRESSURE: 70 MMHG | HEIGHT: 65 IN | BODY MASS INDEX: 33.49 KG/M2 | TEMPERATURE: 98 F | SYSTOLIC BLOOD PRESSURE: 110 MMHG

## 2020-11-05 DIAGNOSIS — Z20.822 SUSPECTED COVID-19 VIRUS INFECTION: Primary | ICD-10-CM

## 2020-11-05 DIAGNOSIS — J02.9 SORE THROAT: ICD-10-CM

## 2020-11-05 PROCEDURE — 3078F DIAST BP <80 MM HG: CPT | Performed by: PHYSICIAN ASSISTANT

## 2020-11-05 PROCEDURE — 87081 CULTURE SCREEN ONLY: CPT | Performed by: PHYSICIAN ASSISTANT

## 2020-11-05 PROCEDURE — 3008F BODY MASS INDEX DOCD: CPT | Performed by: PHYSICIAN ASSISTANT

## 2020-11-05 PROCEDURE — 99213 OFFICE O/P EST LOW 20 MIN: CPT | Performed by: PHYSICIAN ASSISTANT

## 2020-11-05 PROCEDURE — 87880 STREP A ASSAY W/OPTIC: CPT | Performed by: PHYSICIAN ASSISTANT

## 2020-11-05 PROCEDURE — 99072 ADDL SUPL MATRL&STAF TM PHE: CPT | Performed by: PHYSICIAN ASSISTANT

## 2020-11-05 PROCEDURE — 3074F SYST BP LT 130 MM HG: CPT | Performed by: PHYSICIAN ASSISTANT

## 2020-11-05 NOTE — PROGRESS NOTES
CHIEF COMPLAINT:     Patient presents with:  Headache: runny nose,sore thoat s/s for 1 day  no meds taken      HPI:   Jarrod Rivera is a 45year old female who presents with complaints of feeling sick for  2 days.   The patient reports sore throat and n EARS: Right TM normal, no bulging, no retraction, no fluid, bony landmarks normal.  Left TM normal, no bulging, no retraction, no fluid, bony landmarks normal.    NOSE: nostrils patent, no discharge, nasal mucosa pink and not inflamed.   No sinus tenderness Anyone who has been in close contact with someone who has COVID-19 should quarantine for at least 14 days from the time of exposure at home and follow the below recommendations. See recommendations below if COVID19 test is positive.     What counts as close 9. Avoid sharing personal items with other people in your household, like dishes, towels, and bedding   10. Clean all surfaces that are touched often, like counters, tabletops, and doorknobs.  Use household cleaning sprays or wipes according to the label in Please call your primary care provider within 2 days of your discharge to arrange for a telehealth follow-up.  CDC does not recommend repeat testing after a positive test.  Convalescent Plasma Donation Program  [facility], in conjunction with [place] Centers for Disease Control & Prevention (CDC)  10 things you can do to manage your health at home, Javierchange.nl. pdf  https://www.Remoov/

## 2020-11-05 NOTE — PATIENT INSTRUCTIONS
Coronavirus Disease 2019 (COVID-19)     [facility] [address] is committed to the safety and well-being of our patients, members, employees, and communities.  As concerns arise about the new strain of coronavirus that causes COVID-19, [facility] [address] 4. If you have a medical appointment, call the healthcare provider ahead of time and tell them that you have or may have COVID-19.  5. For medical emergencies, call 911 and notify the dispatch personnel that you have or may have COVID-19.   6. Cover your c · At least 10 days have passed since symptoms first appeared OR if asymptomatic patient or date of symptom onset is unclear then use 10 days post COVID test date.    · At least 20 days have passed for severe illness (requiring hospitalization) OR if you are *Some people will be required to have a repeat COVID-19 test in order to be eligible to donate. If you’re instructed by Terrell Childs that a repeat test is required, please contact the [address] COVID-19 Nurse Triage Line at [phone].     Additional Inf

## 2020-11-07 ENCOUNTER — PATIENT MESSAGE (OUTPATIENT)
Dept: FAMILY MEDICINE CLINIC | Facility: CLINIC | Age: 40
End: 2020-11-07

## 2020-11-09 NOTE — TELEPHONE ENCOUNTER
Dr. Danny Pastor    Her testing was negative. Is there anything else she needs to do? She said her son, daughter and her partner all tested positive so she is asking if she needs to be re-tested?

## 2020-11-09 NOTE — TELEPHONE ENCOUNTER
From: Julio Botello  To: Oanh Mendoza MD  Sent: 11/7/2020 8:37 PM CST  Subject: Test Results Question    I have a question about SARS-COV-2 RNA,QUAL RT-PCR resulted on 11/7/20, 8:00 PM.  I was notified on Thursday that I was in direct contact on [facility]

## 2020-11-10 ENCOUNTER — PATIENT MESSAGE (OUTPATIENT)
Dept: FAMILY MEDICINE CLINIC | Facility: CLINIC | Age: 40
End: 2020-11-10

## 2020-11-10 NOTE — TELEPHONE ENCOUNTER
Patient was tested for Covid and it was negative. However, patient's son, daughter and partner all tested positive. Patient is needing a work note to quarantine for this reason. Okay for note? Please advise. Thank you!

## 2020-11-10 NOTE — TELEPHONE ENCOUNTER
From: Adela Botello  To: Lm Bonilla MD  Sent: 11/10/2020 3:51 PM CST  Subject: Other    Good Afternoon      My job is requesting documentation.  My partner and I were exposed on Halloween to someone who tested positive for COVID ( my best friends

## 2020-11-10 NOTE — TELEPHONE ENCOUNTER
----- Message from Amari Botello sent at 11/10/2020  3:51 PM CST -----  Regarding: Other  Contact: [phone]  Good Afternoon                My job is requesting documentation.  My partner and I were exposed on Halloween to someone who tested positive fo

## 2020-11-10 NOTE — TELEPHONE ENCOUNTER
Please see msgs below from pt, pt requesting work note regarding the positive COVID house hold exposure and needs to quarantine. Please advise. Thank you!

## 2020-11-13 ENCOUNTER — TELEPHONE (OUTPATIENT)
Dept: FAMILY MEDICINE CLINIC | Facility: CLINIC | Age: 40
End: 2020-11-13

## 2020-11-13 NOTE — TELEPHONE ENCOUNTER
Pt was exposed to covid, tested negative, but son was positive, pts mother who lives with her has now tested positive. Pt is not sure if she needs to be retested or quarantine again and get extended time off work. Please advise pt.

## 2020-11-13 NOTE — TELEPHONE ENCOUNTER
Called pt and she states she was exposed to covid on 10/31 was tested and had a negative result. Pt states that she lives \"with a big family\" and her mother recently tested positive on 11/10.  Pt informed since everyone living in the same house, same expo

## 2021-11-15 ENCOUNTER — PATIENT MESSAGE (OUTPATIENT)
Dept: FAMILY MEDICINE CLINIC | Facility: CLINIC | Age: 41
End: 2021-11-15

## 2021-11-15 NOTE — TELEPHONE ENCOUNTER
From: Hayes Botello  To: Cadence Carpenter MD  Sent: 11/15/2021 9:59 AM CST  Subject: Waylon Swanson tested positive for COVID on 11/14. She was last in our apartment on 11/13 to move all her belongings to her new place. What is my next step? ?

## 2021-11-17 ENCOUNTER — TELEMEDICINE (OUTPATIENT)
Dept: FAMILY MEDICINE CLINIC | Facility: CLINIC | Age: 41
End: 2021-11-17
Payer: COMMERCIAL

## 2021-11-17 ENCOUNTER — PATIENT MESSAGE (OUTPATIENT)
Dept: FAMILY MEDICINE CLINIC | Facility: CLINIC | Age: 41
End: 2021-11-17

## 2021-11-17 DIAGNOSIS — Z20.822 SUSPECTED COVID-19 VIRUS INFECTION: Primary | ICD-10-CM

## 2021-11-17 PROCEDURE — 99213 OFFICE O/P EST LOW 20 MIN: CPT | Performed by: NURSE PRACTITIONER

## 2021-11-17 RX ORDER — GUAIFENESIN 600 MG
1200 TABLET, EXTENDED RELEASE 12 HR ORAL 2 TIMES DAILY
Qty: 120 TABLET | Refills: 0 | Status: SHIPPED | OUTPATIENT
Start: 2021-11-17 | End: 2021-12-06 | Stop reason: ALTCHOICE

## 2021-11-17 RX ORDER — ALBUTEROL SULFATE 90 UG/1
2 AEROSOL, METERED RESPIRATORY (INHALATION) EVERY 4 HOURS PRN
Qty: 18 G | Refills: 1 | Status: SHIPPED | OUTPATIENT
Start: 2021-11-17 | End: 2021-12-20

## 2021-11-17 RX ORDER — AZITHROMYCIN 250 MG/1
TABLET, FILM COATED ORAL
Qty: 6 TABLET | Refills: 0 | Status: SHIPPED | OUTPATIENT
Start: 2021-11-17 | End: 2021-11-22

## 2021-11-17 NOTE — TELEPHONE ENCOUNTER
From: Abhi Botello  To: MINDI Phillip  Sent: 11/17/2021 12:37 PM CST  Subject: Letter for work       The letter needs to include the specific reason I am being excused from work. Quarantined due to Covid 19 exposure.  I am allowing for any and al

## 2021-11-17 NOTE — PROGRESS NOTES
Telehealth outside of [address] Verbal Consent   I conducted a telehealth visit with Orville Botello today, 11/17/21, which was completed using two-way, real-time interactive  video communication.  This has been done in good gucci to provide con November 10th.    Exposure source:  Roommate     COVID Test Result: Negative tested on Monday     Objective:  Exam  General: AAOx3 , speech is clear , in no acute distress               Assessment/Plan    Diagnoses and all orders for this visit:    Alaina Wade

## 2021-11-18 ENCOUNTER — NURSE ONLY (OUTPATIENT)
Dept: LAB | Age: 41
End: 2021-11-18
Attending: NURSE PRACTITIONER
Payer: COMMERCIAL

## 2021-11-18 DIAGNOSIS — Z20.822 SUSPECTED COVID-19 VIRUS INFECTION: ICD-10-CM

## 2021-12-06 ENCOUNTER — LAB ENCOUNTER (OUTPATIENT)
Dept: LAB | Age: 41
End: 2021-12-06
Attending: FAMILY MEDICINE
Payer: COMMERCIAL

## 2021-12-06 ENCOUNTER — OFFICE VISIT (OUTPATIENT)
Dept: FAMILY MEDICINE CLINIC | Facility: CLINIC | Age: 41
End: 2021-12-06
Payer: COMMERCIAL

## 2021-12-06 VITALS
RESPIRATION RATE: 16 BRPM | OXYGEN SATURATION: 98 % | WEIGHT: 209 LBS | HEIGHT: 65 IN | SYSTOLIC BLOOD PRESSURE: 112 MMHG | BODY MASS INDEX: 34.82 KG/M2 | DIASTOLIC BLOOD PRESSURE: 78 MMHG | HEART RATE: 61 BPM

## 2021-12-06 DIAGNOSIS — Z13.29 SCREENING FOR ENDOCRINE, NUTRITIONAL, METABOLIC AND IMMUNITY DISORDER: ICD-10-CM

## 2021-12-06 DIAGNOSIS — R20.2 NUMBNESS AND TINGLING OF BOTH LEGS: ICD-10-CM

## 2021-12-06 DIAGNOSIS — Z13.228 SCREENING FOR ENDOCRINE, NUTRITIONAL, METABOLIC AND IMMUNITY DISORDER: ICD-10-CM

## 2021-12-06 DIAGNOSIS — Z13.0 SCREENING FOR ENDOCRINE, NUTRITIONAL, METABOLIC AND IMMUNITY DISORDER: ICD-10-CM

## 2021-12-06 DIAGNOSIS — R20.0 NUMBNESS AND TINGLING OF BOTH LEGS: ICD-10-CM

## 2021-12-06 DIAGNOSIS — Z12.31 VISIT FOR SCREENING MAMMOGRAM: ICD-10-CM

## 2021-12-06 DIAGNOSIS — Z00.00 ROUTINE GENERAL MEDICAL EXAMINATION AT A HEALTH CARE FACILITY: Primary | ICD-10-CM

## 2021-12-06 DIAGNOSIS — Z13.21 SCREENING FOR ENDOCRINE, NUTRITIONAL, METABOLIC AND IMMUNITY DISORDER: ICD-10-CM

## 2021-12-06 PROCEDURE — 99212 OFFICE O/P EST SF 10 MIN: CPT | Performed by: FAMILY MEDICINE

## 2021-12-06 PROCEDURE — 80061 LIPID PANEL: CPT | Performed by: FAMILY MEDICINE

## 2021-12-06 PROCEDURE — 99396 PREV VISIT EST AGE 40-64: CPT | Performed by: FAMILY MEDICINE

## 2021-12-06 PROCEDURE — 3074F SYST BP LT 130 MM HG: CPT | Performed by: FAMILY MEDICINE

## 2021-12-06 PROCEDURE — 80050 GENERAL HEALTH PANEL: CPT | Performed by: FAMILY MEDICINE

## 2021-12-06 PROCEDURE — 3078F DIAST BP <80 MM HG: CPT | Performed by: FAMILY MEDICINE

## 2021-12-06 PROCEDURE — 3008F BODY MASS INDEX DOCD: CPT | Performed by: FAMILY MEDICINE

## 2021-12-06 PROCEDURE — 82306 VITAMIN D 25 HYDROXY: CPT | Performed by: FAMILY MEDICINE

## 2021-12-06 RX ORDER — FLUTICASONE PROPIONATE 50 MCG
SPRAY, SUSPENSION (ML) NASAL ONCE
COMMUNITY

## 2021-12-06 NOTE — PROGRESS NOTES
Shimon Evans is a 37year old female who presents for a complete physical exam, no gyn. HPI:     Patient presents with:  Physical: vericose       Patient feels well, dental visit up to date, no hearing problem. Vaccinations up to date.   Some thigh complaints or deficits  HEENT: denies nasal congestion, sinus pain or sore throat; hearing loss negative   RESPIRATORY: denies shortness of breath, wheezing or cough   CARDIOVASCULAR: denies chest pain, SOB, edema,orthopnea, no palpitations   GI: denies na Screening for endocrine, nutritional, metabolic and immunity disorder  -     CBC WITH DIFFERENTIAL WITH PLATELET; Future  -     COMP METABOLIC PANEL (14); Future  -     LIPID PANEL;  Future  -     TSH W REFLEX TO FREE T4; Future  -     VITAMIN D, 25-H

## 2021-12-06 NOTE — PATIENT INSTRUCTIONS
Dr. Lieberman [facility]    Neurology  [facility]      Kirsten Sue   [address]    Phone: [phone]

## 2021-12-09 ENCOUNTER — TELEPHONE (OUTPATIENT)
Dept: FAMILY MEDICINE CLINIC | Facility: CLINIC | Age: 41
End: 2021-12-09

## 2021-12-09 DIAGNOSIS — E55.9 VITAMIN D DEFICIENCY: Primary | ICD-10-CM

## 2021-12-09 RX ORDER — ERGOCALCIFEROL (VITAMIN D2) 1250 MCG
50000 CAPSULE ORAL WEEKLY
Qty: 12 CAPSULE | Refills: 0 | Status: SHIPPED | OUTPATIENT
Start: 2021-12-09 | End: 2022-01-08

## 2021-12-09 NOTE — TELEPHONE ENCOUNTER
----- Message from Sofia Malhotra MD sent at 12/7/2021  2:48 PM CST -----  Vitamin D level is low. Please send Rx for 50,000U per week for 3 months.

## 2021-12-18 ENCOUNTER — PATIENT MESSAGE (OUTPATIENT)
Dept: FAMILY MEDICINE CLINIC | Facility: CLINIC | Age: 41
End: 2021-12-18

## 2021-12-20 ENCOUNTER — HOSPITAL ENCOUNTER (OUTPATIENT)
Age: 41
Discharge: HOME | End: 2021-12-20
Payer: COMMERCIAL

## 2021-12-20 VITALS
HEART RATE: 61 BPM | WEIGHT: 209 LBS | HEIGHT: 66 IN | SYSTOLIC BLOOD PRESSURE: 115 MMHG | DIASTOLIC BLOOD PRESSURE: 74 MMHG | OXYGEN SATURATION: 98 % | BODY MASS INDEX: 33.59 KG/M2 | RESPIRATION RATE: 18 BRPM | TEMPERATURE: 98 F

## 2021-12-20 DIAGNOSIS — Z20.822 ENCOUNTER FOR LABORATORY TESTING FOR COVID-19 VIRUS: Primary | ICD-10-CM

## 2021-12-20 PROCEDURE — 99212 OFFICE O/P EST SF 10 MIN: CPT | Performed by: NURSE PRACTITIONER

## 2021-12-20 PROCEDURE — 81025 URINE PREGNANCY TEST: CPT | Performed by: NURSE PRACTITIONER

## 2021-12-20 PROCEDURE — U0002 COVID-19 LAB TEST NON-CDC: HCPCS | Performed by: NURSE PRACTITIONER

## 2021-12-20 NOTE — ED PROVIDER NOTES
Patient Seen in: Immediate [facility]      History   Patient presents with:  Covid-19 Test: My co workers have tested positive. I would like to test as a precaution.    Sinus Problem    Stated Complaint: Covid-19 Test - My co workers have tested positive Laterality Date   •          • RAYSA  2006    normal pap smears to follow    • TUBAL LIGATION  2008    performed at same time of CS                Social History    Tobacco Use      Smoking status: Never Smoker      Smokeless tobacco: Never Use SARS-COV-2 BY PCR - Normal          MDM      The patient is encouraged to return if any concerning symptoms arise. Additional verbal discharge instructions are given and discussed. Discharge medications are discussed.  The patient is in good condition thro

## 2021-12-20 NOTE — ED INITIAL ASSESSMENT (HPI)
Pt sts exposed to covid positive family 4 days ago.  Pt sts always has sinus issues but it is not worse than usual.

## 2021-12-20 NOTE — TELEPHONE ENCOUNTER
From: Anne Botello  To: Nacho Claire MD  Sent: 12/18/2021 9:48 AM CST  Subject: COVID    I would like to get a COVID Test; my coworkers and a family member have tested positive  Also, I would like to get a pregnancy test just to be sure.  I haven’t

## 2022-01-31 ENCOUNTER — OFFICE VISIT (OUTPATIENT)
Dept: NEUROLOGY | Facility: CLINIC | Age: 42
End: 2022-01-31
Payer: COMMERCIAL

## 2022-01-31 VITALS
OXYGEN SATURATION: 98 % | DIASTOLIC BLOOD PRESSURE: 58 MMHG | BODY MASS INDEX: 35.32 KG/M2 | SYSTOLIC BLOOD PRESSURE: 90 MMHG | WEIGHT: 212 LBS | HEART RATE: 71 BPM | RESPIRATION RATE: 18 BRPM | HEIGHT: 65 IN

## 2022-01-31 DIAGNOSIS — G57.12 MERALGIA PARESTHETICA OF LEFT SIDE: ICD-10-CM

## 2022-01-31 DIAGNOSIS — G62.9 NEUROPATHY: Primary | ICD-10-CM

## 2022-01-31 PROCEDURE — 3008F BODY MASS INDEX DOCD: CPT | Performed by: HOSPITALIST

## 2022-01-31 PROCEDURE — 99204 OFFICE O/P NEW MOD 45 MIN: CPT | Performed by: HOSPITALIST

## 2022-01-31 PROCEDURE — 3074F SYST BP LT 130 MM HG: CPT | Performed by: HOSPITALIST

## 2022-01-31 PROCEDURE — 3078F DIAST BP <80 MM HG: CPT | Performed by: HOSPITALIST

## 2022-01-31 RX ORDER — GABAPENTIN 300 MG/1
300 CAPSULE ORAL NIGHTLY
Qty: 60 CAPSULE | Refills: 3 | Status: SHIPPED | OUTPATIENT
Start: 2022-01-31

## 2022-01-31 RX ORDER — ERGOCALCIFEROL 1.25 MG/1
CAPSULE ORAL
COMMUNITY

## 2022-01-31 NOTE — H&P
Neurology H&P    Rhodia Levo Colon Patient Status:  No patient class for patient encounter    10/5/1980 MRN [id number]   Location [address] [place] [address], [address] [place] [address] Drive, [address] [facility] Attending No att. providers found   Hosp Day # 0 PCP Lizzy Yin MD (hypothyroid) Mother    • Other (anxiety) Mother    • Other (depression) Mother    • Heart Disorder Maternal Grandmother    • Other (Other) Maternal Grandfather    • Other (liver dz) Maternal Grandfather            ROS:  10 point ROS completed and was nega patellar: 1+  Right achilles: 0  Left achilles: 1+  Right ankle clonus: absent  Left ankle clonus: absent         Labs:     TSH 12/6/2021 0.823  Imaging:  N/A     Assessment:  Patient is a 26-year-old female who presents with worsening pain and paresthesia

## 2022-01-31 NOTE — PROGRESS NOTES
New Patient for Numbness in Thighs- Patient states she has been experiencing bilateral thigh numbness for over a year. Patient states the numbness is more severe in left thigh.

## 2022-02-01 ENCOUNTER — LAB ENCOUNTER (OUTPATIENT)
Dept: LAB | Age: 42
End: 2022-02-01
Attending: FAMILY MEDICINE
Payer: COMMERCIAL

## 2022-02-01 DIAGNOSIS — R20.2 NUMBNESS AND TINGLING OF BOTH LEGS: ICD-10-CM

## 2022-02-01 DIAGNOSIS — G62.9 NEUROPATHY: ICD-10-CM

## 2022-02-01 DIAGNOSIS — R20.0 NUMBNESS AND TINGLING OF BOTH LEGS: ICD-10-CM

## 2022-02-01 LAB
EST. AVERAGE GLUCOSE BLD GHB EST-MCNC: 103 MG/DL (ref 68–126)
HBA1C MFR BLD: 5.2 % (ref ?–5.7)
VIT B12 SERPL-MCNC: 349 PG/ML (ref 193–986)

## 2022-02-01 PROCEDURE — 83036 HEMOGLOBIN GLYCOSYLATED A1C: CPT | Performed by: HOSPITALIST

## 2022-02-01 PROCEDURE — 82607 VITAMIN B-12: CPT | Performed by: FAMILY MEDICINE

## 2022-02-01 PROCEDURE — 86334 IMMUNOFIX E-PHORESIS SERUM: CPT | Performed by: HOSPITALIST

## 2022-02-01 PROCEDURE — 83521 IG LIGHT CHAINS FREE EACH: CPT | Performed by: HOSPITALIST

## 2022-02-01 PROCEDURE — 84165 PROTEIN E-PHORESIS SERUM: CPT | Performed by: HOSPITALIST

## 2022-02-02 ENCOUNTER — TELEPHONE (OUTPATIENT)
Dept: FAMILY MEDICINE CLINIC | Facility: CLINIC | Age: 42
End: 2022-02-02

## 2022-02-02 NOTE — TELEPHONE ENCOUNTER
----- Message from Holland Cuadra MD sent at 2/2/2022  9:18 AM CST -----    Vit. B12 OTC, sublingual 1000Ug a day.

## 2022-02-03 LAB
ALBUMIN SERPL ELPH-MCNC: 3.91 G/DL (ref 3.75–5.21)
ALBUMIN/GLOB SERPL: 1.19 {RATIO} (ref 1–2)
ALPHA1 GLOB SERPL ELPH-MCNC: 0.28 G/DL (ref 0.19–0.46)
ALPHA2 GLOB SERPL ELPH-MCNC: 0.77 G/DL (ref 0.48–1.05)
B-GLOBULIN SERPL ELPH-MCNC: 0.97 G/DL (ref 0.68–1.23)
GAMMA GLOB SERPL ELPH-MCNC: 1.27 G/DL (ref 0.62–1.7)
KAPPA LC FREE SER-MCNC: 1.66 MG/DL (ref 0.33–1.94)
KAPPA LC FREE/LAMBDA FREE SER NEPH: 0.96 {RATIO} (ref 0.26–1.65)
LAMBDA LC FREE SERPL-MCNC: 1.72 MG/DL (ref 0.57–2.63)
PROT SERPL-MCNC: 7.2 G/DL (ref 6.4–8.2)

## 2022-02-07 ENCOUNTER — HOSPITAL ENCOUNTER (OUTPATIENT)
Dept: MAMMOGRAPHY | Age: 42
Discharge: HOME | End: 2022-02-07
Attending: FAMILY MEDICINE
Payer: COMMERCIAL

## 2022-02-07 DIAGNOSIS — Z12.31 VISIT FOR SCREENING MAMMOGRAM: ICD-10-CM

## 2022-02-07 PROCEDURE — 77063 BREAST TOMOSYNTHESIS BI: CPT | Performed by: FAMILY MEDICINE

## 2022-02-07 PROCEDURE — 77067 SCR MAMMO BI INCL CAD: CPT | Performed by: FAMILY MEDICINE

## 2022-02-12 ENCOUNTER — IMMUNIZATION (OUTPATIENT)
Dept: LAB | Age: 42
End: 2022-02-12
Attending: EMERGENCY MEDICINE
Payer: COMMERCIAL

## 2022-02-12 DIAGNOSIS — Z23 NEED FOR VACCINATION: Primary | ICD-10-CM

## 2022-02-12 PROCEDURE — 0054A SARSCOV2 VAC 30MCG TRS SUCR: CPT

## 2022-02-23 ENCOUNTER — TELEPHONE (OUTPATIENT)
Dept: SURGERY | Facility: CLINIC | Age: 42
End: 2022-02-23

## 2022-02-23 NOTE — TELEPHONE ENCOUNTER
LVM with patient, appointment on 3/21/22 with Abilio Shay needs to be rescheduled due to Provider being out of the office. Please assist when call is returned.

## 2022-04-01 ENCOUNTER — OFFICE VISIT (OUTPATIENT)
Dept: FAMILY MEDICINE CLINIC | Facility: CLINIC | Age: 42
End: 2022-04-01
Payer: COMMERCIAL

## 2022-04-01 VITALS
DIASTOLIC BLOOD PRESSURE: 80 MMHG | SYSTOLIC BLOOD PRESSURE: 118 MMHG | RESPIRATION RATE: 16 BRPM | OXYGEN SATURATION: 98 % | BODY MASS INDEX: 35.82 KG/M2 | WEIGHT: 215 LBS | HEIGHT: 65 IN | HEART RATE: 83 BPM

## 2022-04-01 DIAGNOSIS — B35.3 FUNGAL INFECTION RIGHT FOOT: Primary | ICD-10-CM

## 2022-04-01 PROCEDURE — 3008F BODY MASS INDEX DOCD: CPT | Performed by: FAMILY MEDICINE

## 2022-04-01 PROCEDURE — 3079F DIAST BP 80-89 MM HG: CPT | Performed by: FAMILY MEDICINE

## 2022-04-01 PROCEDURE — 3074F SYST BP LT 130 MM HG: CPT | Performed by: FAMILY MEDICINE

## 2022-04-01 PROCEDURE — 99213 OFFICE O/P EST LOW 20 MIN: CPT | Performed by: FAMILY MEDICINE

## 2022-04-01 RX ORDER — KETOCONAZOLE 20 MG/G
1 CREAM TOPICAL 2 TIMES DAILY
Qty: 60 G | Refills: 0 | Status: SHIPPED | OUTPATIENT
Start: 2022-04-01

## 2022-04-01 RX ORDER — NYSTATIN 10B UNIT
1 POWDER (EA) MISCELLANEOUS 3 TIMES DAILY PRN
Qty: 1 EACH | Refills: 1 | Status: SHIPPED | OUTPATIENT
Start: 2022-04-01

## 2022-04-13 RX ORDER — ERGOCALCIFEROL 1.25 MG/1
CAPSULE ORAL
Qty: 4 CAPSULE | Refills: 0 | Status: SHIPPED | OUTPATIENT
Start: 2022-04-13

## 2022-05-23 ENCOUNTER — OFFICE VISIT (OUTPATIENT)
Dept: NEUROLOGY | Facility: CLINIC | Age: 42
End: 2022-05-23
Payer: COMMERCIAL

## 2022-05-23 VITALS
SYSTOLIC BLOOD PRESSURE: 100 MMHG | HEART RATE: 80 BPM | OXYGEN SATURATION: 96 % | RESPIRATION RATE: 16 BRPM | WEIGHT: 218.63 LBS | HEIGHT: 65 IN | BODY MASS INDEX: 36.43 KG/M2 | DIASTOLIC BLOOD PRESSURE: 78 MMHG

## 2022-05-23 DIAGNOSIS — G57.12 MERALGIA PARESTHETICA OF LEFT SIDE: Primary | ICD-10-CM

## 2022-05-23 PROCEDURE — 3078F DIAST BP <80 MM HG: CPT | Performed by: HOSPITALIST

## 2022-05-23 PROCEDURE — 3074F SYST BP LT 130 MM HG: CPT | Performed by: HOSPITALIST

## 2022-05-23 PROCEDURE — 99214 OFFICE O/P EST MOD 30 MIN: CPT | Performed by: HOSPITALIST

## 2022-05-23 PROCEDURE — 3008F BODY MASS INDEX DOCD: CPT | Performed by: HOSPITALIST

## 2022-05-23 RX ORDER — GABAPENTIN 400 MG/1
400 CAPSULE ORAL NIGHTLY
Qty: 60 CAPSULE | Refills: 5 | Status: SHIPPED | OUTPATIENT
Start: 2022-05-23

## 2022-05-23 RX ORDER — GABAPENTIN 300 MG/1
300 CAPSULE ORAL NIGHTLY
Qty: 60 CAPSULE | Refills: 3 | Status: SHIPPED | OUTPATIENT
Start: 2022-05-23 | End: 2022-05-23 | Stop reason: ALTCHOICE

## 2022-05-31 RX ORDER — ERGOCALCIFEROL 1.25 MG/1
CAPSULE ORAL
Qty: 4 CAPSULE | Refills: 0 | Status: SHIPPED | OUTPATIENT
Start: 2022-05-31

## 2022-07-05 RX ORDER — ERGOCALCIFEROL 1.25 MG/1
CAPSULE ORAL
Qty: 4 CAPSULE | Refills: 0 | OUTPATIENT
Start: 2022-07-05

## 2022-08-01 ENCOUNTER — HOSPITAL ENCOUNTER (OUTPATIENT)
Age: 42
Discharge: HOME | End: 2022-08-01
Payer: COMMERCIAL

## 2022-08-01 VITALS
DIASTOLIC BLOOD PRESSURE: 68 MMHG | WEIGHT: 200 LBS | RESPIRATION RATE: 18 BRPM | OXYGEN SATURATION: 96 % | HEART RATE: 97 BPM | SYSTOLIC BLOOD PRESSURE: 103 MMHG | BODY MASS INDEX: 32.14 KG/M2 | TEMPERATURE: 99 F | HEIGHT: 66 IN

## 2022-08-01 DIAGNOSIS — U07.1 COVID-19: ICD-10-CM

## 2022-08-01 DIAGNOSIS — B34.9 VIRAL ILLNESS: Primary | ICD-10-CM

## 2022-08-01 LAB — SARS-COV-2 RNA RESP QL NAA+PROBE: DETECTED

## 2022-08-01 PROCEDURE — 99213 OFFICE O/P EST LOW 20 MIN: CPT | Performed by: NURSE PRACTITIONER

## 2022-08-01 PROCEDURE — U0002 COVID-19 LAB TEST NON-CDC: HCPCS | Performed by: NURSE PRACTITIONER

## 2022-08-01 RX ORDER — NIRMATRELVIR AND RITONAVIR 300-100 MG
KIT ORAL
Qty: 30 TABLET | Refills: 0 | Status: SHIPPED | OUTPATIENT
Start: 2022-08-01

## 2022-08-01 NOTE — ED INITIAL ASSESSMENT (HPI)
Patient c/o runny nose since Saturday. Fever of 103.0, body aches, sore throat and diarrhea yesterday. Would like to be tested for Covid.

## 2022-09-22 ENCOUNTER — LAB ENCOUNTER (OUTPATIENT)
Dept: LAB | Age: 42
End: 2022-09-22
Attending: NURSE PRACTITIONER

## 2022-09-22 ENCOUNTER — OFFICE VISIT (OUTPATIENT)
Dept: FAMILY MEDICINE CLINIC | Facility: CLINIC | Age: 42
End: 2022-09-22

## 2022-09-22 VITALS
HEIGHT: 66 IN | DIASTOLIC BLOOD PRESSURE: 70 MMHG | HEART RATE: 76 BPM | WEIGHT: 210 LBS | OXYGEN SATURATION: 98 % | RESPIRATION RATE: 16 BRPM | SYSTOLIC BLOOD PRESSURE: 110 MMHG | BODY MASS INDEX: 33.75 KG/M2

## 2022-09-22 DIAGNOSIS — K62.5 RECTAL BLEEDING: ICD-10-CM

## 2022-09-22 DIAGNOSIS — D64.9 ANEMIA, UNSPECIFIED TYPE: ICD-10-CM

## 2022-09-22 DIAGNOSIS — K62.5 RECTAL BLEEDING: Primary | ICD-10-CM

## 2022-09-22 LAB
ALBUMIN SERPL-MCNC: 3.4 G/DL (ref 3.4–5)
ALBUMIN/GLOB SERPL: 0.8 {RATIO} (ref 1–2)
ALP LIVER SERPL-CCNC: 63 U/L
ALT SERPL-CCNC: 41 U/L
ANION GAP SERPL CALC-SCNC: 6 MMOL/L (ref 0–18)
APPEARANCE: CLEAR
AST SERPL-CCNC: 28 U/L (ref 15–37)
BASOPHILS # BLD AUTO: 0.05 X10(3) UL (ref 0–0.2)
BASOPHILS NFR BLD AUTO: 0.8 %
BILIRUB SERPL-MCNC: 0.4 MG/DL (ref 0.1–2)
BILIRUBIN: NEGATIVE
BUN BLD-MCNC: 14 MG/DL (ref 7–18)
CALCIUM BLD-MCNC: 8.8 MG/DL (ref 8.5–10.1)
CHLORIDE SERPL-SCNC: 106 MMOL/L (ref 98–112)
CO2 SERPL-SCNC: 26 MMOL/L (ref 21–32)
CREAT BLD-MCNC: 0.7 MG/DL
EOSINOPHIL # BLD AUTO: 0.35 X10(3) UL (ref 0–0.7)
EOSINOPHIL NFR BLD AUTO: 5.4 %
ERYTHROCYTE [DISTWIDTH] IN BLOOD BY AUTOMATED COUNT: 15.4 %
FASTING STATUS PATIENT QL REPORTED: YES
GFR SERPLBLD BASED ON 1.73 SQ M-ARVRAT: 112 ML/MIN/1.73M2 (ref 60–?)
GLOBULIN PLAS-MCNC: 4.2 G/DL (ref 2.8–4.4)
GLUCOSE (URINE DIPSTICK): NEGATIVE MG/DL
GLUCOSE BLD-MCNC: 84 MG/DL (ref 70–99)
HCT VFR BLD AUTO: 35.4 %
HGB BLD-MCNC: 10.9 G/DL
IMM GRANULOCYTES # BLD AUTO: 0.01 X10(3) UL (ref 0–1)
IMM GRANULOCYTES NFR BLD: 0.2 %
KETONES (URINE DIPSTICK): NEGATIVE MG/DL
LEUKOCYTES: NEGATIVE
LYMPHOCYTES # BLD AUTO: 1.39 X10(3) UL (ref 1–4)
LYMPHOCYTES NFR BLD AUTO: 21.4 %
MCH RBC QN AUTO: 25.8 PG (ref 26–34)
MCHC RBC AUTO-ENTMCNC: 30.8 G/DL (ref 31–37)
MCV RBC AUTO: 83.9 FL
MONOCYTES # BLD AUTO: 0.59 X10(3) UL (ref 0.1–1)
MONOCYTES NFR BLD AUTO: 9.1 %
MULTISTIX LOT#: NORMAL NUMERIC
NEUTROPHILS # BLD AUTO: 4.12 X10 (3) UL (ref 1.5–7.7)
NEUTROPHILS # BLD AUTO: 4.12 X10(3) UL (ref 1.5–7.7)
NEUTROPHILS NFR BLD AUTO: 63.1 %
NITRITE, URINE: NEGATIVE
OCCULT BLOOD: NEGATIVE
OSMOLALITY SERPL CALC.SUM OF ELEC: 286 MOSM/KG (ref 275–295)
PH, URINE: 8 (ref 4.5–8)
PLATELET # BLD AUTO: 392 10(3)UL (ref 150–450)
POTASSIUM SERPL-SCNC: 4 MMOL/L (ref 3.5–5.1)
PROT SERPL-MCNC: 7.6 G/DL (ref 6.4–8.2)
PROTEIN (URINE DIPSTICK): NEGATIVE MG/DL
RBC # BLD AUTO: 4.22 X10(6)UL
SODIUM SERPL-SCNC: 138 MMOL/L (ref 136–145)
SPECIFIC GRAVITY: 1.01 (ref 1–1.03)
URINE-COLOR: YELLOW
UROBILINOGEN,SEMI-QN: 0.2 MG/DL (ref 0–1.9)
WBC # BLD AUTO: 6.5 X10(3) UL (ref 4–11)

## 2022-09-22 PROCEDURE — 83550 IRON BINDING TEST: CPT | Performed by: NURSE PRACTITIONER

## 2022-09-22 PROCEDURE — 3008F BODY MASS INDEX DOCD: CPT | Performed by: NURSE PRACTITIONER

## 2022-09-22 PROCEDURE — 83540 ASSAY OF IRON: CPT | Performed by: NURSE PRACTITIONER

## 2022-09-22 PROCEDURE — 80053 COMPREHEN METABOLIC PANEL: CPT | Performed by: NURSE PRACTITIONER

## 2022-09-22 PROCEDURE — 81003 URINALYSIS AUTO W/O SCOPE: CPT | Performed by: NURSE PRACTITIONER

## 2022-09-22 PROCEDURE — 3078F DIAST BP <80 MM HG: CPT | Performed by: NURSE PRACTITIONER

## 2022-09-22 PROCEDURE — 99214 OFFICE O/P EST MOD 30 MIN: CPT | Performed by: NURSE PRACTITIONER

## 2022-09-22 PROCEDURE — 82728 ASSAY OF FERRITIN: CPT | Performed by: NURSE PRACTITIONER

## 2022-09-22 PROCEDURE — 85025 COMPLETE CBC W/AUTO DIFF WBC: CPT | Performed by: NURSE PRACTITIONER

## 2022-09-22 PROCEDURE — 3074F SYST BP LT 130 MM HG: CPT | Performed by: NURSE PRACTITIONER

## 2022-09-26 ENCOUNTER — LAB ENCOUNTER (OUTPATIENT)
Dept: LAB | Age: 42
End: 2022-09-26
Attending: FAMILY MEDICINE

## 2022-09-26 DIAGNOSIS — K62.5 RECTAL BLEEDING: ICD-10-CM

## 2022-09-26 LAB
DEPRECATED HBV CORE AB SER IA-ACNC: 5.2 NG/ML
IRON SATN MFR SERPL: 8 %
IRON SERPL-MCNC: 35 UG/DL
TIBC SERPL-MCNC: 453 UG/DL (ref 240–450)
TRANSFERRIN SERPL-MCNC: 304 MG/DL (ref 200–360)

## 2022-09-26 PROCEDURE — 82274 ASSAY TEST FOR BLOOD FECAL: CPT | Performed by: NURSE PRACTITIONER

## 2022-09-27 ENCOUNTER — TELEPHONE (OUTPATIENT)
Dept: FAMILY MEDICINE CLINIC | Facility: CLINIC | Age: 42
End: 2022-09-27

## 2022-09-27 LAB — HEMOCCULT STL QL: NEGATIVE

## 2022-09-27 NOTE — TELEPHONE ENCOUNTER
----- Message from MINDI Alcantar sent at 9/26/2022  9:55 AM CDT -----  Iron deficiency anemia can take iron supplement with Vitamin C  for better absorption , increase water intake causes constipation

## 2022-10-25 ENCOUNTER — PATIENT MESSAGE (OUTPATIENT)
Dept: FAMILY MEDICINE CLINIC | Facility: CLINIC | Age: 42
End: 2022-10-25

## 2022-10-25 RX ORDER — CEFPROZIL 500 MG/1
500 TABLET, FILM COATED ORAL 2 TIMES DAILY
Qty: 14 TABLET | Refills: 0 | Status: SHIPPED | OUTPATIENT
Start: 2022-10-25 | End: 2022-11-01

## 2022-10-25 NOTE — TELEPHONE ENCOUNTER
Brad Danielle MD  [facility] 11 minutes ago (1:00 PM)     LP    Cefzil 500mg bid for 7 days, benadryl etc.    Message text

## 2022-10-25 NOTE — TELEPHONE ENCOUNTER
Pt was seen in UC for URI & given Zpak Rx & developed an itchy red rash, she is requesting alternate antibiotic. Pt states she was Neg for Covid, Flu, RSV & strep.   LOV 4/1

## 2022-10-25 NOTE — TELEPHONE ENCOUNTER
From: Nicole Botello  To: Allean Sicard, MD  Sent: 10/25/2022 9:06 AM CDT  Subject: Zithromax    I went to immediate care over the weekend. I was prescribed antibiotic. I took the first dose Sunday morning and on Monday at 3am woke up to a rash on my hands itching severely. Today I took the third dose and a rash appeared on my ankle an hour and a half later. Is there a way to check my chart to see if I have ever been prescribed Zithromax in the past?? If not, is it possible this is an allergic reaction to the antibiotic? ?

## 2023-01-17 ENCOUNTER — HOSPITAL ENCOUNTER (OUTPATIENT)
Dept: GENERAL RADIOLOGY | Age: 43
Discharge: HOME | End: 2023-01-17
Attending: NURSE PRACTITIONER
Payer: COMMERCIAL

## 2023-01-17 ENCOUNTER — OFFICE VISIT (OUTPATIENT)
Dept: FAMILY MEDICINE CLINIC | Facility: CLINIC | Age: 43
End: 2023-01-17
Payer: COMMERCIAL

## 2023-01-17 ENCOUNTER — LAB ENCOUNTER (OUTPATIENT)
Dept: LAB | Age: 43
End: 2023-01-17
Attending: NURSE PRACTITIONER
Payer: COMMERCIAL

## 2023-01-17 VITALS
RESPIRATION RATE: 16 BRPM | BODY MASS INDEX: 31.34 KG/M2 | OXYGEN SATURATION: 96 % | HEIGHT: 66 IN | SYSTOLIC BLOOD PRESSURE: 110 MMHG | HEART RATE: 84 BPM | WEIGHT: 195 LBS | TEMPERATURE: 98 F | DIASTOLIC BLOOD PRESSURE: 70 MMHG

## 2023-01-17 DIAGNOSIS — R07.89 RIGHT-SIDED CHEST WALL PAIN: ICD-10-CM

## 2023-01-17 DIAGNOSIS — Z13.0 SCREENING FOR IRON DEFICIENCY ANEMIA: ICD-10-CM

## 2023-01-17 DIAGNOSIS — R10.11 RUQ PAIN: ICD-10-CM

## 2023-01-17 DIAGNOSIS — R79.89 ELEVATED PLATELET COUNT: ICD-10-CM

## 2023-01-17 DIAGNOSIS — Z12.31 ENCOUNTER FOR SCREENING MAMMOGRAM FOR MALIGNANT NEOPLASM OF BREAST: ICD-10-CM

## 2023-01-17 DIAGNOSIS — Z00.00 LABORATORY EXAMINATION ORDERED AS PART OF A ROUTINE GENERAL MEDICAL EXAMINATION: ICD-10-CM

## 2023-01-17 DIAGNOSIS — R93.89 ABNORMAL X-RAY: ICD-10-CM

## 2023-01-17 DIAGNOSIS — R22.2 LUMP IN CHEST: ICD-10-CM

## 2023-01-17 DIAGNOSIS — R07.89 RIGHT-SIDED CHEST WALL PAIN: Primary | ICD-10-CM

## 2023-01-17 LAB
ALBUMIN SERPL-MCNC: 3.7 G/DL (ref 3.4–5)
ALBUMIN/GLOB SERPL: 0.9 {RATIO} (ref 1–2)
ALP LIVER SERPL-CCNC: 65 U/L
ALT SERPL-CCNC: 15 U/L
AMYLASE SERPL-CCNC: 41 U/L (ref 25–115)
ANION GAP SERPL CALC-SCNC: 0 MMOL/L (ref 0–18)
APPEARANCE: CLEAR
AST SERPL-CCNC: 11 U/L (ref 15–37)
BASOPHILS # BLD AUTO: 0.06 X10(3) UL (ref 0–0.2)
BASOPHILS NFR BLD AUTO: 0.9 %
BILIRUB SERPL-MCNC: 0.4 MG/DL (ref 0.1–2)
BILIRUBIN: NEGATIVE
BUN BLD-MCNC: 8 MG/DL (ref 7–18)
CALCIUM BLD-MCNC: 8.8 MG/DL (ref 8.5–10.1)
CHLORIDE SERPL-SCNC: 110 MMOL/L (ref 98–112)
CHOLEST SERPL-MCNC: 164 MG/DL (ref ?–200)
CO2 SERPL-SCNC: 27 MMOL/L (ref 21–32)
CREAT BLD-MCNC: 0.68 MG/DL
D DIMER PPP FEU-MCNC: <0.27 UG/ML FEU (ref ?–0.5)
EOSINOPHIL # BLD AUTO: 0.24 X10(3) UL (ref 0–0.7)
EOSINOPHIL NFR BLD AUTO: 3.5 %
ERYTHROCYTE [DISTWIDTH] IN BLOOD BY AUTOMATED COUNT: 15.6 %
FASTING PATIENT LIPID ANSWER: YES
FASTING STATUS PATIENT QL REPORTED: YES
GFR SERPLBLD BASED ON 1.73 SQ M-ARVRAT: 112 ML/MIN/1.73M2 (ref 60–?)
GLOBULIN PLAS-MCNC: 4 G/DL (ref 2.8–4.4)
GLUCOSE (URINE DIPSTICK): NEGATIVE MG/DL
GLUCOSE BLD-MCNC: 98 MG/DL (ref 70–99)
HCT VFR BLD AUTO: 32.6 %
HDLC SERPL-MCNC: 71 MG/DL (ref 40–59)
HGB BLD-MCNC: 9.6 G/DL
IMM GRANULOCYTES # BLD AUTO: 0.01 X10(3) UL (ref 0–1)
IMM GRANULOCYTES NFR BLD: 0.1 %
KETONES (URINE DIPSTICK): NEGATIVE MG/DL
LDLC SERPL CALC-MCNC: 74 MG/DL (ref ?–100)
LEUKOCYTES: NEGATIVE
LIPASE SERPL-CCNC: 46 U/L (ref 73–393)
LYMPHOCYTES # BLD AUTO: 1.71 X10(3) UL (ref 1–4)
LYMPHOCYTES NFR BLD AUTO: 25 %
MCH RBC QN AUTO: 22.6 PG (ref 26–34)
MCHC RBC AUTO-ENTMCNC: 29.4 G/DL (ref 31–37)
MCV RBC AUTO: 76.9 FL
MONOCYTES # BLD AUTO: 0.64 X10(3) UL (ref 0.1–1)
MONOCYTES NFR BLD AUTO: 9.4 %
MULTISTIX LOT#: ABNORMAL NUMERIC
NEUTROPHILS # BLD AUTO: 4.18 X10 (3) UL (ref 1.5–7.7)
NEUTROPHILS # BLD AUTO: 4.18 X10(3) UL (ref 1.5–7.7)
NEUTROPHILS NFR BLD AUTO: 61.1 %
NITRITE, URINE: NEGATIVE
NONHDLC SERPL-MCNC: 93 MG/DL (ref ?–130)
OSMOLALITY SERPL CALC.SUM OF ELEC: 282 MOSM/KG (ref 275–295)
PH, URINE: 6 (ref 4.5–8)
PLATELET # BLD AUTO: 570 10(3)UL (ref 150–450)
POTASSIUM SERPL-SCNC: 3.7 MMOL/L (ref 3.5–5.1)
PROT SERPL-MCNC: 7.7 G/DL (ref 6.4–8.2)
PROTEIN (URINE DIPSTICK): NEGATIVE MG/DL
RBC # BLD AUTO: 4.24 X10(6)UL
SODIUM SERPL-SCNC: 137 MMOL/L (ref 136–145)
SPECIFIC GRAVITY: 1.01 (ref 1–1.03)
TRIGL SERPL-MCNC: 109 MG/DL (ref 30–149)
TSI SER-ACNC: 0.74 MIU/ML (ref 0.36–3.74)
URINE-COLOR: YELLOW
UROBILINOGEN,SEMI-QN: 0.2 MG/DL (ref 0–1.9)
VLDLC SERPL CALC-MCNC: 17 MG/DL (ref 0–30)
WBC # BLD AUTO: 6.8 X10(3) UL (ref 4–11)

## 2023-01-17 PROCEDURE — 83540 ASSAY OF IRON: CPT | Performed by: NURSE PRACTITIONER

## 2023-01-17 PROCEDURE — 82150 ASSAY OF AMYLASE: CPT | Performed by: NURSE PRACTITIONER

## 2023-01-17 PROCEDURE — 83690 ASSAY OF LIPASE: CPT | Performed by: NURSE PRACTITIONER

## 2023-01-17 PROCEDURE — 81003 URINALYSIS AUTO W/O SCOPE: CPT | Performed by: NURSE PRACTITIONER

## 2023-01-17 PROCEDURE — 3078F DIAST BP <80 MM HG: CPT | Performed by: NURSE PRACTITIONER

## 2023-01-17 PROCEDURE — 82728 ASSAY OF FERRITIN: CPT | Performed by: NURSE PRACTITIONER

## 2023-01-17 PROCEDURE — 93000 ELECTROCARDIOGRAM COMPLETE: CPT | Performed by: NURSE PRACTITIONER

## 2023-01-17 PROCEDURE — 80061 LIPID PANEL: CPT | Performed by: NURSE PRACTITIONER

## 2023-01-17 PROCEDURE — 3074F SYST BP LT 130 MM HG: CPT | Performed by: NURSE PRACTITIONER

## 2023-01-17 PROCEDURE — 71046 X-RAY EXAM CHEST 2 VIEWS: CPT | Performed by: NURSE PRACTITIONER

## 2023-01-17 PROCEDURE — 3008F BODY MASS INDEX DOCD: CPT | Performed by: NURSE PRACTITIONER

## 2023-01-17 PROCEDURE — 99214 OFFICE O/P EST MOD 30 MIN: CPT | Performed by: NURSE PRACTITIONER

## 2023-01-17 PROCEDURE — 85379 FIBRIN DEGRADATION QUANT: CPT | Performed by: NURSE PRACTITIONER

## 2023-01-17 PROCEDURE — 80050 GENERAL HEALTH PANEL: CPT | Performed by: NURSE PRACTITIONER

## 2023-01-17 PROCEDURE — 83550 IRON BINDING TEST: CPT | Performed by: NURSE PRACTITIONER

## 2023-01-17 RX ORDER — DICLOFENAC POTASSIUM 50 MG/1
50 TABLET, FILM COATED ORAL 2 TIMES DAILY
Qty: 30 TABLET | Refills: 0 | Status: SHIPPED | OUTPATIENT
Start: 2023-01-17

## 2023-01-18 ENCOUNTER — TELEPHONE (OUTPATIENT)
Dept: FAMILY MEDICINE CLINIC | Facility: CLINIC | Age: 43
End: 2023-01-18

## 2023-01-18 DIAGNOSIS — D50.9 IRON DEFICIENCY ANEMIA, UNSPECIFIED IRON DEFICIENCY ANEMIA TYPE: Primary | ICD-10-CM

## 2023-01-18 DIAGNOSIS — D75.839 THROMBOCYTOSIS: ICD-10-CM

## 2023-01-18 LAB
DEPRECATED HBV CORE AB SER IA-ACNC: 3.9 NG/ML
IRON SATN MFR SERPL: 3 %
IRON SERPL-MCNC: 12 UG/DL
TIBC SERPL-MCNC: 443 UG/DL (ref 240–450)
TRANSFERRIN SERPL-MCNC: 297 MG/DL (ref 200–360)

## 2023-01-18 NOTE — TELEPHONE ENCOUNTER
----- Message from MINDI Mccabe sent at 1/18/2023  9:13 AM CST -----  Iron deficiency anemia , high platelet count -referral to see hematology for possible iron infusions    mychart to pt and referral in system

## 2023-01-19 LAB — F5 P.R506Q BLD/T QL: NORMAL

## 2023-01-20 ENCOUNTER — PATIENT MESSAGE (OUTPATIENT)
Dept: FAMILY MEDICINE CLINIC | Facility: CLINIC | Age: 43
End: 2023-01-20

## 2023-01-20 DIAGNOSIS — Z11.3 SCREEN FOR STD (SEXUALLY TRANSMITTED DISEASE): Primary | ICD-10-CM

## 2023-01-20 NOTE — TELEPHONE ENCOUNTER
From: Velma Botello  Sent: 1/20/2023 9:47 AM CST  To: [facility] Clinical Staff  Subject: Results    I have a question. If someone has HIV would the platlette count be high? ? Is it possible to get an HIV Test done ? ?

## 2023-01-23 ENCOUNTER — LAB ENCOUNTER (OUTPATIENT)
Dept: LAB | Age: 43
End: 2023-01-23
Attending: NURSE PRACTITIONER
Payer: COMMERCIAL

## 2023-01-23 ENCOUNTER — TELEPHONE (OUTPATIENT)
Dept: FAMILY MEDICINE CLINIC | Facility: CLINIC | Age: 43
End: 2023-01-23

## 2023-01-23 DIAGNOSIS — Z11.3 SCREEN FOR STD (SEXUALLY TRANSMITTED DISEASE): ICD-10-CM

## 2023-01-23 PROCEDURE — 87389 HIV-1 AG W/HIV-1&-2 AB AG IA: CPT | Performed by: NURSE PRACTITIONER

## 2023-01-30 ENCOUNTER — HOSPITAL ENCOUNTER (OUTPATIENT)
Dept: CT IMAGING | Age: 43
Discharge: HOME | End: 2023-01-30
Attending: NURSE PRACTITIONER
Payer: COMMERCIAL

## 2023-01-30 DIAGNOSIS — R93.89 ABNORMAL X-RAY: ICD-10-CM

## 2023-01-30 DIAGNOSIS — R22.2 LUMP IN CHEST: ICD-10-CM

## 2023-01-30 PROCEDURE — 71250 CT THORAX DX C-: CPT | Performed by: NURSE PRACTITIONER

## 2023-02-13 ENCOUNTER — HOSPITAL ENCOUNTER (OUTPATIENT)
Dept: MAMMOGRAPHY | Age: 43
Discharge: HOME | End: 2023-02-13
Attending: NURSE PRACTITIONER
Payer: COMMERCIAL

## 2023-02-13 DIAGNOSIS — Z12.31 ENCOUNTER FOR SCREENING MAMMOGRAM FOR MALIGNANT NEOPLASM OF BREAST: ICD-10-CM

## 2023-02-13 PROCEDURE — 77063 BREAST TOMOSYNTHESIS BI: CPT | Performed by: NURSE PRACTITIONER

## 2023-02-13 PROCEDURE — 77067 SCR MAMMO BI INCL CAD: CPT | Performed by: NURSE PRACTITIONER

## 2023-02-14 ENCOUNTER — OFFICE VISIT (OUTPATIENT)
Dept: HEMATOLOGY/ONCOLOGY | Age: 43
End: 2023-02-14
Attending: INTERNAL MEDICINE
Payer: COMMERCIAL

## 2023-02-14 VITALS
TEMPERATURE: 97 F | HEART RATE: 90 BPM | RESPIRATION RATE: 18 BRPM | OXYGEN SATURATION: 98 % | DIASTOLIC BLOOD PRESSURE: 72 MMHG | BODY MASS INDEX: 31 KG/M2 | SYSTOLIC BLOOD PRESSURE: 109 MMHG | WEIGHT: 194.63 LBS

## 2023-02-14 DIAGNOSIS — D50.0 IRON DEFICIENCY ANEMIA SECONDARY TO BLOOD LOSS (CHRONIC): Primary | ICD-10-CM

## 2023-02-14 DIAGNOSIS — N92.1 MENORRHAGIA WITH IRREGULAR CYCLE: ICD-10-CM

## 2023-02-14 PROCEDURE — 99245 OFF/OP CONSLTJ NEW/EST HI 55: CPT | Performed by: INTERNAL MEDICINE

## 2023-02-14 RX ORDER — TRANEXAMIC ACID 650 MG/1
1300 TABLET ORAL EVERY 8 HOURS
Qty: 30 TABLET | Refills: 11 | Status: SHIPPED | OUTPATIENT
Start: 2023-02-14

## 2023-02-14 NOTE — PROGRESS NOTES
Education Record    Learner:  Patient    Disease / Diagnosis: HOMA, thrombocytosis    Barriers / Limitations:  None   Comments:    Method:  Discussion   Comments:    General Topics:  Plan of care reviewed   Comments:    Outcome:  Shows understanding   Comments: consult referred by Dr. Noris Orellana. Pt reports fatigue. States that her periods are irregular and when she does have then they are heavy lasting 7-14 days. Pt states she has taken oral iron before but could not tolerate. Pt denies any dizziness or SOB.

## 2023-02-20 ENCOUNTER — OFFICE VISIT (OUTPATIENT)
Dept: HEMATOLOGY/ONCOLOGY | Age: 43
End: 2023-02-20
Attending: INTERNAL MEDICINE
Payer: COMMERCIAL

## 2023-02-20 VITALS
OXYGEN SATURATION: 99 % | HEART RATE: 56 BPM | RESPIRATION RATE: 16 BRPM | DIASTOLIC BLOOD PRESSURE: 73 MMHG | SYSTOLIC BLOOD PRESSURE: 127 MMHG | TEMPERATURE: 99 F

## 2023-02-20 DIAGNOSIS — D50.0 IRON DEFICIENCY ANEMIA SECONDARY TO BLOOD LOSS (CHRONIC): Primary | ICD-10-CM

## 2023-02-20 PROCEDURE — 96365 THER/PROPH/DIAG IV INF INIT: CPT

## 2023-02-20 PROCEDURE — 96376 TX/PRO/DX INJ SAME DRUG ADON: CPT

## 2023-02-20 NOTE — PROGRESS NOTES
Education Record    Learner:  Patient    Disease / Diagnosis: here for first dose infed    Barriers / Limitations:  None    Method:  Brief focused, printed material and  reinforcement    General Topics:  Plan of care reviewed    Outcome: patient ambulatory. No complaints. Tolerated test dose. Monitored for 60 min post. Tolerated infusion. Monitored for 30 min. VSS. states she will get her appt from [facility]. Aware to get labs done a day or 2 prior to MD apt. States she will go to a clinic near her house. Discharged in stable condition.  Shows understanding

## 2023-03-27 ENCOUNTER — LAB ENCOUNTER (OUTPATIENT)
Dept: LAB | Age: 43
End: 2023-03-27
Attending: INTERNAL MEDICINE
Payer: COMMERCIAL

## 2023-03-27 DIAGNOSIS — D50.0 IRON DEFICIENCY ANEMIA SECONDARY TO BLOOD LOSS (CHRONIC): ICD-10-CM

## 2023-03-27 LAB
BASOPHILS # BLD AUTO: 0.04 X10(3) UL (ref 0–0.2)
BASOPHILS NFR BLD AUTO: 0.8 %
DEPRECATED HBV CORE AB SER IA-ACNC: 68.5 NG/ML
EOSINOPHIL # BLD AUTO: 0.21 X10(3) UL (ref 0–0.7)
EOSINOPHIL NFR BLD AUTO: 4.2 %
ERYTHROCYTE [DISTWIDTH] IN BLOOD BY AUTOMATED COUNT: 23 %
HCT VFR BLD AUTO: 36.6 %
HGB BLD-MCNC: 11.2 G/DL
IMM GRANULOCYTES # BLD AUTO: 0.01 X10(3) UL (ref 0–1)
IMM GRANULOCYTES NFR BLD: 0.2 %
LYMPHOCYTES # BLD AUTO: 1.47 X10(3) UL (ref 1–4)
LYMPHOCYTES NFR BLD AUTO: 29.3 %
MCH RBC QN AUTO: 25.1 PG (ref 26–34)
MCHC RBC AUTO-ENTMCNC: 30.6 G/DL (ref 31–37)
MCV RBC AUTO: 82.1 FL
MONOCYTES # BLD AUTO: 0.48 X10(3) UL (ref 0.1–1)
MONOCYTES NFR BLD AUTO: 9.6 %
NEUTROPHILS # BLD AUTO: 2.81 X10 (3) UL (ref 1.5–7.7)
NEUTROPHILS # BLD AUTO: 2.81 X10(3) UL (ref 1.5–7.7)
NEUTROPHILS NFR BLD AUTO: 55.9 %
PLATELET # BLD AUTO: 380 10(3)UL (ref 150–450)
PLATELET MORPHOLOGY: NORMAL
RBC # BLD AUTO: 4.46 X10(6)UL
WBC # BLD AUTO: 5 X10(3) UL (ref 4–11)

## 2023-03-27 PROCEDURE — 85025 COMPLETE CBC W/AUTO DIFF WBC: CPT

## 2023-03-27 PROCEDURE — 36415 COLL VENOUS BLD VENIPUNCTURE: CPT

## 2023-03-27 PROCEDURE — 82728 ASSAY OF FERRITIN: CPT

## 2023-03-28 ENCOUNTER — APPOINTMENT (OUTPATIENT)
Dept: HEMATOLOGY/ONCOLOGY | Age: 43
End: 2023-03-28
Attending: INTERNAL MEDICINE
Payer: COMMERCIAL

## 2023-03-28 ENCOUNTER — OFFICE VISIT (OUTPATIENT)
Dept: HEMATOLOGY/ONCOLOGY | Age: 43
End: 2023-03-28
Attending: INTERNAL MEDICINE
Payer: COMMERCIAL

## 2023-03-28 VITALS
HEART RATE: 101 BPM | TEMPERATURE: 97 F | OXYGEN SATURATION: 98 % | DIASTOLIC BLOOD PRESSURE: 59 MMHG | BODY MASS INDEX: 31 KG/M2 | SYSTOLIC BLOOD PRESSURE: 94 MMHG | RESPIRATION RATE: 18 BRPM | WEIGHT: 191.19 LBS

## 2023-03-28 DIAGNOSIS — N92.1 MENORRHAGIA WITH IRREGULAR CYCLE: ICD-10-CM

## 2023-03-28 DIAGNOSIS — D64.9 NORMOCYTIC ANEMIA: ICD-10-CM

## 2023-03-28 DIAGNOSIS — D50.0 IRON DEFICIENCY ANEMIA SECONDARY TO BLOOD LOSS (CHRONIC): Primary | ICD-10-CM

## 2023-03-28 PROCEDURE — 99214 OFFICE O/P EST MOD 30 MIN: CPT | Performed by: INTERNAL MEDICINE

## 2023-03-30 PROBLEM — D64.9 NORMOCYTIC ANEMIA: Status: ACTIVE | Noted: 2023-03-30

## 2023-04-03 ENCOUNTER — OFFICE VISIT (OUTPATIENT)
Dept: OBGYN CLINIC | Facility: CLINIC | Age: 43
End: 2023-04-03
Payer: COMMERCIAL

## 2023-04-03 VITALS
BODY MASS INDEX: 30.46 KG/M2 | DIASTOLIC BLOOD PRESSURE: 66 MMHG | HEIGHT: 66 IN | SYSTOLIC BLOOD PRESSURE: 100 MMHG | WEIGHT: 189.5 LBS | HEART RATE: 74 BPM

## 2023-04-03 DIAGNOSIS — Z12.4 SCREENING FOR CERVICAL CANCER: ICD-10-CM

## 2023-04-03 DIAGNOSIS — N93.9 ABNORMAL UTERINE BLEEDING (AUB): Primary | ICD-10-CM

## 2023-04-03 DIAGNOSIS — N93.0 PCB (POST COITAL BLEEDING): ICD-10-CM

## 2023-04-03 DIAGNOSIS — Z11.3 SCREENING EXAMINATION FOR STD (SEXUALLY TRANSMITTED DISEASE): ICD-10-CM

## 2023-04-03 DIAGNOSIS — Z01.812 PRE-PROCEDURAL LABORATORY EXAMINATION: ICD-10-CM

## 2023-04-03 DIAGNOSIS — N92.1 MENORRHAGIA WITH IRREGULAR CYCLE: ICD-10-CM

## 2023-04-03 LAB
CONTROL LINE PRESENT WITH A CLEAR BACKGROUND (YES/NO): YES YES/NO
PREGNANCY TEST, URINE: NEGATIVE

## 2023-04-03 PROCEDURE — 87591 N.GONORRHOEAE DNA AMP PROB: CPT | Performed by: OBSTETRICS & GYNECOLOGY

## 2023-04-03 PROCEDURE — 87491 CHLMYD TRACH DNA AMP PROBE: CPT | Performed by: OBSTETRICS & GYNECOLOGY

## 2023-04-03 NOTE — PROCEDURES
Procedure: Endometrial biopsy     Date of Procedure: 04/03/23    Pre-procedure diagnosis:   AUB    Post-procedure diagnosis:    AUB    Indications:   40year old female [id number] who presents for endometrial biopsy 2/2 AUB    Procedure details: The procedure, risks, benefits and alternatives were discussed with the patient. The patient was informed of risks including but not limited to the risk of bleeding, infection, injury and insufficient tissue collection. All questions and concerns were addressed. The patient provided verbal and written consent. The patient was placed in a supine position with feet positioned into stirrups. A sterile speculum was placed into the vagina and the cervix was visualized with findings noted below. The cervix was cleaned and prepped with betadine. Lidocaine gel was placed on the anterior lip of the cervix. An Kaelyn was placed on the anterior lip of the cervix. The endometrial Pipelle was then advanced through the cervical canal. The uterus sounded to 8 cm. The Pipelle was then engaged with suction force noted and advance in various angles along the uterine cavity. A moderate amount of endometrial tissue was noted and collected to be sent to pathology. This was repeated for 1 more pass. The Kaelyn was removed. Good hemostasis noted. The patient tolerated the procedure well. The patient was advised to return to for Mirena IUD insertion. Findings:   Normal cervix, no lesions   Normal uterus, sounded to 8 cm   Moderate amount of endometrial tissue   S/p EMB  Good hemostasis     Disposition: Stable    Complications: None    Follow up:  As instructed     Fabricio Carmona MD   [facility] - OBGYN    Note to patient and family   The [employer] makes medical notes available to patients in the interest of transparency. However, please be advised that this is a medical document. It is intended as jguc-io-euoj communication.   It is written and medical language may contain abbreviations or verbiage that are technical and unfamiliar. It may appear blunt or direct. Medical documents are intended to carry relevant information, facts as evident, and the clinical opinion of the practitioner. This note could include assistance by [employer] recognition.  Errors in content may be related to improper recognition by the system; efforts to review and correct have been done but errors may still exist.

## 2023-04-03 NOTE — PATIENT INSTRUCTIONS
Please make an office visit for intrauterine device placement procedure visit     Please take Advil/Ibuprofen 600 mg by mouth at least 1-2 hours prior to your procedure       Please make appointment for placement during your menses to assist in success of placement.

## 2023-04-04 LAB
C TRACH DNA SPEC QL NAA+PROBE: NEGATIVE
N GONORRHOEA DNA SPEC QL NAA+PROBE: NEGATIVE

## 2023-04-07 ENCOUNTER — MED REC SCAN ONLY (OUTPATIENT)
Dept: OBGYN CLINIC | Facility: CLINIC | Age: 43
End: 2023-04-07

## 2023-04-18 LAB
HPV I/H RISK 1 DNA SPEC QL NAA+PROBE: NEGATIVE
LAST PAP RESULT: NORMAL

## 2023-05-02 ENCOUNTER — ULTRASOUND ENCOUNTER (OUTPATIENT)
Dept: OBGYN CLINIC | Facility: CLINIC | Age: 43
End: 2023-05-02
Payer: COMMERCIAL

## 2023-05-02 DIAGNOSIS — N93.9 ABNORMAL UTERINE BLEEDING (AUB): Primary | ICD-10-CM

## 2023-05-02 PROCEDURE — 76856 US EXAM PELVIC COMPLETE: CPT | Performed by: OBSTETRICS & GYNECOLOGY

## 2023-05-02 PROCEDURE — 76830 TRANSVAGINAL US NON-OB: CPT | Performed by: OBSTETRICS & GYNECOLOGY

## 2023-05-03 ENCOUNTER — OFFICE VISIT (OUTPATIENT)
Dept: OBGYN CLINIC | Facility: CLINIC | Age: 43
End: 2023-05-03
Payer: COMMERCIAL

## 2023-05-03 VITALS
WEIGHT: 189 LBS | RESPIRATION RATE: 18 BRPM | BODY MASS INDEX: 30.37 KG/M2 | SYSTOLIC BLOOD PRESSURE: 110 MMHG | HEIGHT: 66 IN | HEART RATE: 69 BPM | DIASTOLIC BLOOD PRESSURE: 72 MMHG

## 2023-05-03 DIAGNOSIS — Z01.812 PRE-PROCEDURE LAB EXAM: ICD-10-CM

## 2023-05-03 DIAGNOSIS — N93.9 ABNORMAL UTERINE BLEEDING (AUB): Primary | ICD-10-CM

## 2023-05-03 DIAGNOSIS — Z30.430 ENCOUNTER FOR INSERTION OF INTRAUTERINE CONTRACEPTIVE DEVICE (IUD): ICD-10-CM

## 2023-05-03 LAB
CONTROL LINE PRESENT WITH A CLEAR BACKGROUND (YES/NO): YES YES/NO
KIT LOT #: NORMAL NUMERIC
PREGNANCY TEST, URINE: NEGATIVE

## 2023-05-03 PROCEDURE — 3008F BODY MASS INDEX DOCD: CPT | Performed by: OBSTETRICS & GYNECOLOGY

## 2023-05-03 PROCEDURE — 3074F SYST BP LT 130 MM HG: CPT | Performed by: OBSTETRICS & GYNECOLOGY

## 2023-05-03 PROCEDURE — 58300 INSERT INTRAUTERINE DEVICE: CPT | Performed by: OBSTETRICS & GYNECOLOGY

## 2023-05-03 PROCEDURE — 3078F DIAST BP <80 MM HG: CPT | Performed by: OBSTETRICS & GYNECOLOGY

## 2023-05-03 PROCEDURE — 81025 URINE PREGNANCY TEST: CPT | Performed by: OBSTETRICS & GYNECOLOGY

## 2023-05-03 NOTE — PROCEDURES
Procedure: Intrauterine device insertion - Mirena    Date of Procedure: 05/03/23    Pre-procedure diagnosis:  AUB    Post-procedure diagnosis:   AUB    Indications:   40year old female [id number] who presents for Intrauterine device insertion with Mirena for AUB management. Procedure details: The patient was counseled on various methods of contraception. The patient requested long acting reversible contraception with Mirena Intrauterine Device for AUB management. The procedure, risks, benefits and alternatives were discussed with the patient. The patient was informed of risks including but not limited to the risk of bleeding, infection, injury, improper placement, pregnancy and irregular bleeding. All questions and concerns were addressed. The patient provided verbal and written consent. The patient was placed in supine position with legs position in stirrups. A sterile speculum was placed in the vagina and the cervix was visualized. The cervix was cleaned and prepped with betadine. Lidocaine gel was placed on the anterior lip of the cervix. An Allis clamp was placed on the anterior lip of the cervix. The uterus was sounded to 8 cm. The Mirena Intrauterine Device was then prepped and loaded in sterile fashion. The Mirena Intrauterine Device was then inserted through the cervical canal into the uterine cavity, deployed and the remaining device removed. The strings were visualized at the external os and cut to 2 cm. The Kaelyn was removed. Good hemostasis was then noted. The Mirena IUD strings were then tucked behind the cervix and the all instruments were removed from the vagina. The patient tolerated the procedure well. She was counseled on the recommendation for back up method of contraception for 7 days after placement. The patient verbalized understanding.      Disposition: Stable    Complications: None    Follow up: 4 weeks     Jeferson Torres MD   [facility] - OBGYN        Note to patient and family   The 21st Century Cures Act makes medical notes available to patients in the interest of transparency. However, please be advised that this is a medical document. It is intended as vums-ag-rtzv communication. It is written and medical language may contain abbreviations or verbiage that are technical and unfamiliar. It may appear blunt or direct. Medical documents are intended to carry relevant information, facts as evident, and the clinical opinion of the practitioner.

## 2023-05-03 NOTE — PATIENT INSTRUCTIONS
[facility] [place]  After Care Instructions for IUD      Bleeding   You may experience irregular bleeding for the fist 3-6 months. If your bleeding becomes heavier than a normal menstrual cycle, please contact our office. Pain  You may experience mild menstrual cramping after the IUD insertion that will typically last 24-48hrs. You may take Ibuprofen, Tylenol or Aleve to relieve the discomfort. If you experience severe or persistent pain, please contact our office. Restrictions    You should avoid sexual intercourse or tampon use for 1 day after insertion. Please use a backup method of contraception for 4-7 days with the [place]. When to contact our office  If you are experiencing discomfort described as worse than menstrual cramps that is not relieved by ibuprofen   Fever of 100.4 or greater  Vaginal bleeding that is saturating 1 pad per hour    Any discomfort or poking sensation during intercourse or other sexual activity      Follow up in 4-6 weeks for IUD check. If you have additional questions or concerns, please call us at [phone].

## 2023-05-15 ENCOUNTER — MED REC SCAN ONLY (OUTPATIENT)
Dept: OBGYN CLINIC | Facility: CLINIC | Age: 43
End: 2023-05-15

## 2023-05-23 ENCOUNTER — LAB ENCOUNTER (OUTPATIENT)
Dept: LAB | Age: 43
End: 2023-05-23
Attending: INTERNAL MEDICINE
Payer: COMMERCIAL

## 2023-05-23 DIAGNOSIS — D50.0 IRON DEFICIENCY ANEMIA SECONDARY TO BLOOD LOSS (CHRONIC): ICD-10-CM

## 2023-05-23 DIAGNOSIS — D64.9 NORMOCYTIC ANEMIA: ICD-10-CM

## 2023-05-23 LAB
BASOPHILS # BLD AUTO: 0.07 X10(3) UL (ref 0–0.2)
BASOPHILS NFR BLD AUTO: 0.9 %
DEPRECATED HBV CORE AB SER IA-ACNC: 14.6 NG/ML
EOSINOPHIL # BLD AUTO: 0.32 X10(3) UL (ref 0–0.7)
EOSINOPHIL NFR BLD AUTO: 3.9 %
ERYTHROCYTE [DISTWIDTH] IN BLOOD BY AUTOMATED COUNT: 15.3 %
HCT VFR BLD AUTO: 35.6 %
HGB BLD-MCNC: 11.3 G/DL
HGB RETIC QN AUTO: 33.2 PG (ref 28.2–36.6)
IMM GRANULOCYTES # BLD AUTO: 0.02 X10(3) UL (ref 0–1)
IMM GRANULOCYTES NFR BLD: 0.2 %
IMM RETICS NFR: 0.13 RATIO (ref 0.1–0.3)
IRON SATN MFR SERPL: 11 %
IRON SERPL-MCNC: 39 UG/DL
LYMPHOCYTES # BLD AUTO: 2.5 X10(3) UL (ref 1–4)
LYMPHOCYTES NFR BLD AUTO: 30.5 %
MCH RBC QN AUTO: 26.6 PG (ref 26–34)
MCHC RBC AUTO-ENTMCNC: 31.7 G/DL (ref 31–37)
MCV RBC AUTO: 83.8 FL
MONOCYTES # BLD AUTO: 0.71 X10(3) UL (ref 0.1–1)
MONOCYTES NFR BLD AUTO: 8.6 %
NEUTROPHILS # BLD AUTO: 4.59 X10 (3) UL (ref 1.5–7.7)
NEUTROPHILS # BLD AUTO: 4.59 X10(3) UL (ref 1.5–7.7)
NEUTROPHILS NFR BLD AUTO: 55.9 %
PLATELET # BLD AUTO: 382 10(3)UL (ref 150–450)
RBC # BLD AUTO: 4.25 X10(6)UL
RETICS # AUTO: 50.6 X10(3) UL (ref 22.5–147.5)
RETICS/RBC NFR AUTO: 1.2 %
TIBC SERPL-MCNC: 356 UG/DL (ref 240–450)
TRANSFERRIN SERPL-MCNC: 239 MG/DL (ref 200–360)
VIT B12 SERPL-MCNC: 312 PG/ML (ref 193–986)
WBC # BLD AUTO: 8.2 X10(3) UL (ref 4–11)

## 2023-05-23 PROCEDURE — 83540 ASSAY OF IRON: CPT

## 2023-05-23 PROCEDURE — 83921 ORGANIC ACID SINGLE QUANT: CPT

## 2023-05-23 PROCEDURE — 85045 AUTOMATED RETICULOCYTE COUNT: CPT

## 2023-05-23 PROCEDURE — 85025 COMPLETE CBC W/AUTO DIFF WBC: CPT

## 2023-05-23 PROCEDURE — 36415 COLL VENOUS BLD VENIPUNCTURE: CPT

## 2023-05-23 PROCEDURE — 82607 VITAMIN B-12: CPT

## 2023-05-23 PROCEDURE — 83550 IRON BINDING TEST: CPT

## 2023-05-23 PROCEDURE — 82728 ASSAY OF FERRITIN: CPT

## 2023-05-29 LAB — METHYLMALONIC ACID: 190 NMOL/L

## 2023-05-30 ENCOUNTER — OFFICE VISIT (OUTPATIENT)
Dept: HEMATOLOGY/ONCOLOGY | Age: 43
End: 2023-05-30
Attending: INTERNAL MEDICINE
Payer: COMMERCIAL

## 2023-05-30 ENCOUNTER — ULTRASOUND ENCOUNTER (OUTPATIENT)
Dept: OBGYN CLINIC | Facility: CLINIC | Age: 43
End: 2023-05-30
Payer: COMMERCIAL

## 2023-05-30 VITALS
WEIGHT: 189.5 LBS | BODY MASS INDEX: 31 KG/M2 | OXYGEN SATURATION: 100 % | SYSTOLIC BLOOD PRESSURE: 108 MMHG | RESPIRATION RATE: 18 BRPM | HEART RATE: 63 BPM | TEMPERATURE: 97 F | DIASTOLIC BLOOD PRESSURE: 73 MMHG

## 2023-05-30 VITALS — SYSTOLIC BLOOD PRESSURE: 106 MMHG | RESPIRATION RATE: 18 BRPM | DIASTOLIC BLOOD PRESSURE: 70 MMHG | HEART RATE: 65 BPM

## 2023-05-30 DIAGNOSIS — D50.0 IRON DEFICIENCY ANEMIA SECONDARY TO BLOOD LOSS (CHRONIC): Primary | ICD-10-CM

## 2023-05-30 DIAGNOSIS — N92.1 MENORRHAGIA WITH IRREGULAR CYCLE: ICD-10-CM

## 2023-05-30 DIAGNOSIS — N93.9 ABNORMAL UTERINE BLEEDING (AUB): Primary | ICD-10-CM

## 2023-05-30 PROCEDURE — 99215 OFFICE O/P EST HI 40 MIN: CPT | Performed by: INTERNAL MEDICINE

## 2023-05-30 PROCEDURE — 96365 THER/PROPH/DIAG IV INF INIT: CPT

## 2023-05-30 PROCEDURE — 3074F SYST BP LT 130 MM HG: CPT | Performed by: INTERNAL MEDICINE

## 2023-05-30 PROCEDURE — 3078F DIAST BP <80 MM HG: CPT | Performed by: INTERNAL MEDICINE

## 2023-05-30 NOTE — PROGRESS NOTES
Education Record    Learner:  Patient    Disease / Lillian Rivera deficiency anemia secondary to blood loss     Barriers / Limitations:  None   Comments:    Method:  Brief focused   Comments:    General Topics:  Infection, Medication, Pain, Precautions, Procedure, Side effects and symptom management, Plan of care reviewed and Fall risk and prevention   Comments:    Outcome:  Shows understanding   Comments: Tolerated well. Given AVS

## 2023-06-28 ENCOUNTER — PATIENT MESSAGE (OUTPATIENT)
Dept: OBGYN CLINIC | Facility: CLINIC | Age: 43
End: 2023-06-28

## 2023-07-05 ENCOUNTER — PATIENT MESSAGE (OUTPATIENT)
Dept: OBGYN CLINIC | Facility: CLINIC | Age: 43
End: 2023-07-05

## 2023-11-22 ENCOUNTER — LAB ENCOUNTER (OUTPATIENT)
Dept: LAB | Age: 43
End: 2023-11-22
Attending: INTERNAL MEDICINE
Payer: COMMERCIAL

## 2023-11-22 DIAGNOSIS — D50.0 IRON DEFICIENCY ANEMIA SECONDARY TO BLOOD LOSS (CHRONIC): ICD-10-CM

## 2023-11-22 LAB
BASOPHILS # BLD AUTO: 0.04 X10(3) UL (ref 0–0.2)
BASOPHILS NFR BLD AUTO: 0.7 %
DEPRECATED HBV CORE AB SER IA-ACNC: 120.9 NG/ML
EOSINOPHIL # BLD AUTO: 0.29 X10(3) UL (ref 0–0.7)
EOSINOPHIL NFR BLD AUTO: 5.1 %
ERYTHROCYTE [DISTWIDTH] IN BLOOD BY AUTOMATED COUNT: 12.1 %
HCT VFR BLD AUTO: 38.5 %
HGB BLD-MCNC: 12.3 G/DL
IMM GRANULOCYTES # BLD AUTO: 0.02 X10(3) UL (ref 0–1)
IMM GRANULOCYTES NFR BLD: 0.4 %
LYMPHOCYTES # BLD AUTO: 1.57 X10(3) UL (ref 1–4)
LYMPHOCYTES NFR BLD AUTO: 27.8 %
MCH RBC QN AUTO: 29.2 PG (ref 26–34)
MCHC RBC AUTO-ENTMCNC: 31.9 G/DL (ref 31–37)
MCV RBC AUTO: 91.4 FL
MONOCYTES # BLD AUTO: 0.57 X10(3) UL (ref 0.1–1)
MONOCYTES NFR BLD AUTO: 10.1 %
NEUTROPHILS # BLD AUTO: 3.15 X10 (3) UL (ref 1.5–7.7)
NEUTROPHILS # BLD AUTO: 3.15 X10(3) UL (ref 1.5–7.7)
NEUTROPHILS NFR BLD AUTO: 55.9 %
PLATELET # BLD AUTO: 322 10(3)UL (ref 150–450)
RBC # BLD AUTO: 4.21 X10(6)UL
WBC # BLD AUTO: 5.6 X10(3) UL (ref 4–11)

## 2023-11-22 PROCEDURE — 85025 COMPLETE CBC W/AUTO DIFF WBC: CPT

## 2023-11-22 PROCEDURE — 82728 ASSAY OF FERRITIN: CPT

## 2023-11-22 PROCEDURE — 36415 COLL VENOUS BLD VENIPUNCTURE: CPT

## 2023-11-27 ENCOUNTER — TELEPHONE (OUTPATIENT)
Dept: HEMATOLOGY/ONCOLOGY | Age: 43
End: 2023-11-27

## 2023-11-27 ENCOUNTER — APPOINTMENT (OUTPATIENT)
Dept: HEMATOLOGY/ONCOLOGY | Age: 43
End: 2023-11-27
Attending: INTERNAL MEDICINE
Payer: COMMERCIAL

## 2023-11-27 ENCOUNTER — TELEPHONE (OUTPATIENT)
Dept: HEMATOLOGY/ONCOLOGY | Facility: HOSPITAL | Age: 43
End: 2023-11-27

## 2023-11-27 DIAGNOSIS — D50.0 IRON DEFICIENCY ANEMIA SECONDARY TO BLOOD LOSS (CHRONIC): Primary | ICD-10-CM

## 2023-11-28 ENCOUNTER — APPOINTMENT (OUTPATIENT)
Dept: HEMATOLOGY/ONCOLOGY | Age: 43
End: 2023-11-28
Attending: INTERNAL MEDICINE
Payer: COMMERCIAL

## 2023-12-26 ENCOUNTER — APPOINTMENT (OUTPATIENT)
Dept: HEMATOLOGY/ONCOLOGY | Age: 43
End: 2023-12-26
Attending: INTERNAL MEDICINE
Payer: COMMERCIAL

## 2024-02-06 ENCOUNTER — OFFICE VISIT (OUTPATIENT)
Dept: HEMATOLOGY/ONCOLOGY | Age: 44
End: 2024-02-06
Attending: INTERNAL MEDICINE
Payer: COMMERCIAL

## 2024-02-06 VITALS
DIASTOLIC BLOOD PRESSURE: 71 MMHG | SYSTOLIC BLOOD PRESSURE: 113 MMHG | HEART RATE: 65 BPM | RESPIRATION RATE: 18 BRPM | TEMPERATURE: 99 F | BODY MASS INDEX: 29 KG/M2 | WEIGHT: 180.81 LBS | OXYGEN SATURATION: 98 %

## 2024-02-06 DIAGNOSIS — N92.1 MENORRHAGIA WITH IRREGULAR CYCLE: ICD-10-CM

## 2024-02-06 DIAGNOSIS — D50.0 IRON DEFICIENCY ANEMIA SECONDARY TO BLOOD LOSS (CHRONIC): Primary | ICD-10-CM

## 2024-02-06 LAB
BASOPHILS # BLD AUTO: 0.06 X10(3) UL (ref 0–0.2)
BASOPHILS NFR BLD AUTO: 0.8 %
DEPRECATED HBV CORE AB SER IA-ACNC: 181.2 NG/ML
EOSINOPHIL # BLD AUTO: 0.26 X10(3) UL (ref 0–0.7)
EOSINOPHIL NFR BLD AUTO: 3.4 %
ERYTHROCYTE [DISTWIDTH] IN BLOOD BY AUTOMATED COUNT: 12.8 %
HCT VFR BLD AUTO: 38.7 %
HGB BLD-MCNC: 12.8 G/DL
IMM GRANULOCYTES # BLD AUTO: 0.02 X10(3) UL (ref 0–1)
IMM GRANULOCYTES NFR BLD: 0.3 %
LYMPHOCYTES # BLD AUTO: 1.81 X10(3) UL (ref 1–4)
LYMPHOCYTES NFR BLD AUTO: 23.6 %
MCH RBC QN AUTO: 29 PG (ref 26–34)
MCHC RBC AUTO-ENTMCNC: 33.1 G/DL (ref 31–37)
MCV RBC AUTO: 87.6 FL
MONOCYTES # BLD AUTO: 0.66 X10(3) UL (ref 0.1–1)
MONOCYTES NFR BLD AUTO: 8.6 %
NEUTROPHILS # BLD AUTO: 4.87 X10 (3) UL (ref 1.5–7.7)
NEUTROPHILS # BLD AUTO: 4.87 X10(3) UL (ref 1.5–7.7)
NEUTROPHILS NFR BLD AUTO: 63.3 %
PLATELET # BLD AUTO: 361 10(3)UL (ref 150–450)
RBC # BLD AUTO: 4.42 X10(6)UL
WBC # BLD AUTO: 7.7 X10(3) UL (ref 4–11)

## 2024-02-06 PROCEDURE — 99214 OFFICE O/P EST MOD 30 MIN: CPT | Performed by: INTERNAL MEDICINE

## 2024-02-06 NOTE — PROGRESS NOTES
Hematology Clinic Follow Up Visit    Patient Name: Mary Botello  Medical Record Number: [id number]   YOB: 1980   PCP: Radha Queen MD   Other providers: Dr. Reema Herrera (gyn)    Reason for Consultation:  Mary Botello was seen today for the diagnosis of iron deficiency anemia    Hematologic History:  *[facility]  -13- wbc 6.1, hgb 11.1, MCV 82, plt 363  -10/4/16- wbc 11.8, hgb 13.0, MCV 86, plt 437, ferritin 18, TSAT 11%  -18- wbc 6.6, hgb 10.9, MCV 85, plt 433  -19- wbc 7.7, hgb 10.2, MCV 82, plt 376  -3/7/20- wbc 6.4, hgb 10.4, MCV 83, plt 385  -21- wbc 6.7, hgb 11.1, MCV 81, plt 404  -22- B12 349, normal monoclonal protein study  -22- wbc 6.5, hgb 10.9, MCV 83, plt 392, ferritin 5, TSAT 8%  -23- wbc 6.8, hgb 9.6, MCV 76, plt 570, ferritin 3, TSAT 3%  -23- IV INFeD 1000mg  -3/27/23- wbc 5.0, hgb 11.2, MCV 82, plt 380, ferritin 68  -5/3/23- mirena IUD placed  -23- wbc 8.2, hgb 11.3, MCV 83, plt 382, ferritin 14, TSAT 11%  -23- IV monoferric 1000mg  -23- wbc 5.6, hgb 12.3, MCV 91, plt 322, ferritin 120  -24- wbc 7.7, hgb 12.8, MCV 87, plt 361, ferritin 181    ===============================  Interval events: Presents for follow-up.  Patient reports feeling well.  Her energy is normal.  She denies any recent dyspnea, lightheadedness, or chest pain.  She has not had any vaginal or menstrual bleeding since November following Mirena IUD placement 2023.  She denies any other bleeding or dark stools.  She has never had an EGD or colonoscopy.  She never had pica or restless leg syndrome.    Past Medical History:  Past Medical History:   Diagnosis Date    Anxiety state, unspecified     Iron deficiency anemia secondary to blood loss (chronic)     Menorrhagia with irregular cycle     Ovarian cyst     Panic attack      OB/GYN history: , 1 miscarriage, 1 , 3 term deliveries- , , . She has a history of irregular  menorrhagia since delivering her twins in .  She did not have menorrhagia at menarche.  Her menses tend to occur less often than normal, over the last few years her menses occur about 8 times per year.  Her menstrual periods last 7 to 14 days, on her heaviest day has to change a pad + tampon q1 hrs. she has up to 10 heavy days during her menstrual period.  Has previously tried OCP that did not help slow bleeding.  She passes clots.  +prior pelvic ultrasound ovarian cysts.  Mirena IUD placed 5/3/23.    Past Surgical History:   Procedure Laterality Date              LEEP      normal pap smears to follow     TUBAL LIGATION      performed at same time of CS     Home Medications:   tranexamic acid 650 MG Oral Tab Take 2 tablets (1,300 mg total) by mouth every 8 (eight) hours. X 5 days starting at onset of menstrual bleeding 30 tablet 11     Allergies:   Allergies   Allergen Reactions    Dust OTHER (SEE COMMENTS)     Runny nose, sneezing       Psychosocial History:  Social History     Social History Narrative    , has 4 children who live with her ages 14, 14, 21, and 22 (as of 2023).  She works for [employer].     Social History     Socioeconomic History    Marital status:    Tobacco Use    Smoking status: Never    Smokeless tobacco: Never   Vaping Use    Vaping Use: Never used   Substance and Sexual Activity    Alcohol use: Yes     Comment: socially, no hx of excessive use    Drug use: No    Sexual activity: Yes     Partners: Male   Other Topics Concern    Caffeine Concern Yes     Comment: coffee 1 cup daily    Exercise No    Seat Belt Yes   Social History Narrative    , has 4 children who live with her ages 14, 14, 21, and 22 (as of 2023).  She works for [employer].     Family Medical History:  Family History   Problem Relation Age of Onset    Diabetes Mother     Hypertension Mother     Anemia Mother     Other (hypothyroid) Mother     Other (anxiety) Mother     Other  (depression) Mother     Prostate Cancer Father 72    Heart Disorder Maternal Grandmother     Other (Other) Maternal Grandfather     Other (liver dz) Maternal Grandfather     Ovarian Cancer Paternal Aunt         60's   Mother and sister both had heavy menstrual bleeding and required hysterectomy.      Review of Systems:  A 10-point ROS was done with pertinent positives and negative per the HPI    Vital Signs:  Height: --  Weight: 82 kg (180 lb 12.8 oz) (02/06 1140)  BSA (Calculated - sq m): --  Pulse: 65 (02/06 1140)  BP: 113/71 (02/06 1140)  Temp: 98.9 °F (37.2 °C) (02/06 1140)  Do Not Use - Resp Rate: --  SpO2: 98 % (02/06 1140)    Wt Readings from Last 6 Encounters:   02/06/24 82 kg (180 lb 12.8 oz)   05/30/23 86 kg (189 lb 8 oz)   05/03/23 85.7 kg (189 lb)   04/03/23 86 kg (189 lb 8 oz)   03/28/23 86.7 kg (191 lb 3.2 oz)   02/14/23 88.3 kg (194 lb 9.6 oz)     Physical Examination:  General: Patient is alert and oriented, not in acute distress  Psych: Mood and affect are appropriate  Eyes: EOMI  ENT: Oropharynx is clear  CV: Regular rate and rhythm, no murmurs, no LE edema  Respiratory: Lungs clear to auscultation bilaterally  GI/Abd: Soft, non-tender with normoactive bowel sounds, no hepatosplenomegaly  Neurological: Grossly intact   Lymphatics: No palpable lymphadenopathy  Skin: no rashes or petechiae    Laboratory:  Lab Results   Component Value Date    WBC 7.7 02/06/2024    WBC 5.6 11/22/2023    WBC 8.2 05/23/2023    HGB 12.8 02/06/2024    HGB 12.3 11/22/2023    HGB 11.3 (L) 05/23/2023    HCT 38.7 02/06/2024    MCV 87.6 02/06/2024    MCH 29.0 02/06/2024    MCHC 33.1 02/06/2024    RDW 12.8 02/06/2024    .0 02/06/2024    .0 11/22/2023    .0 05/23/2023     Lab Results   Component Value Date    GLU 98 01/17/2023    BUN 8 01/17/2023    BUNCREA 15.9 12/19/2019    CREATSERUM 0.68 01/17/2023    CREATSERUM 0.70 09/22/2022    CREATSERUM 0.64 12/06/2021    ANIONGAP 0 01/17/2023     10/04/2016     GFRNAA 112 12/06/2021    GFRAA 129 12/06/2021    CA 8.8 01/17/2023    OSMOCALC 282 01/17/2023    ALKPHO 65 01/17/2023    AST 11 (L) 01/17/2023    ALT 15 01/17/2023    BILT 0.4 01/17/2023    TP 7.7 01/17/2023    ALB 3.7 01/17/2023    GLOBULIN 4.0 01/17/2023     01/17/2023    K 3.7 01/17/2023     01/17/2023    CO2 27.0 01/17/2023     No results found for: \"PTT\", \"PT\", \"INR\"    Lab Results   Component Value Date    REITCPERCENT 1.2 05/23/2023     Lab Results   Component Value Date    RETHE 33.2 05/23/2023     Lab Results   Component Value Date    RONAK 181.2 02/06/2024    RONAK 120.9 11/22/2023    RONAK 14.6 05/23/2023    RONAK 68.5 03/27/2023    RONAK 3.9 (L) 01/17/2023     Lab Results   Component Value Date    SAT 11 (L) 05/23/2023    SAT 3 (L) 01/17/2023    SAT 8 (L) 09/22/2022    SAT 11 (L) 10/04/2016     Soluble transferrin receptor  No results found for: \"STFRNR\"  Lab Results   Component Value Date    B12 312 05/23/2023    B12 349 02/01/2022     Lab Results   Component Value Date     05/23/2023     No results found for: \"FOLIC\"  No results found for: \"COPPER\"  No results found for: \"ESRML\"  No results found for: \"CRP\"  No results found for: \"LDH\"  No results found for: \"HAPT\"  No results found for: \"ELECTMS1\"  Lab Results   Component Value Date    KAPPALTCHN 1.656 02/01/2022      Lab Results   Component Value Date    LAMBDALTCH 1.721 02/01/2022     Lab Results   Component Value Date    KAPLAMRATIO 0.96 02/01/2022     Lab Results   Component Value Date    TSH 0.745 01/17/2023    TSH 0.823 12/06/2021    TSH 0.877 03/07/2020    TSH 1.060 12/19/2019    TSH 0.587 09/27/2018    TSH 2.120 10/04/2016    TSH 1.260 08/28/2013     Lab Results   Component Value Date    T4F 1.1 12/19/2019     No results found for: \"T3TOT\"    Imaging:    US pelvis 3/11/20: FINDINGS:                UTERUS:  10.03 cm x 4.31 cm x 6.39 cm     Endometrium Thickness: 1.30 cm     The uterus appears normal in size, shape, and echogenicity.   Probable nabothian cyst.  This has a somewhat complex appearance with internal echogenicity measuring 1.7 x 1.4 x 1.5 cm.  This is stable from the prior exam.   RIGHT OVARY:  2.12 cm x 1.14 cm x 1.43 cm     The right ovary appears normal in size, shape, and echogenicity. No significant masses are identified.   LEFT OVARY:  2.51 cm x 1.57 cm x 1.47 cm     The previously identified left ovarian cyst is not appreciated on today's exam.   CUL-DE-SAC:  Normal.  No fluid or mass.     OTHER:  Negative.     CONCLUSION:  A previously identified left ovarian cyst is no longer appreciated.  No free fluid is seen.  Possible complex nabothian cyst which has an overall stable appearance.     Impression & Plan:     *Iron deficiency anemia- likely related to menorrhagia.    -Intolerant to PO iron supplementation.  Previously improved with IV iron infusion.    -Today her hemoglobin and iron stores are normal.  -Hopefully since her Mirena IUD has slowed her menstrual blood loss she will not develop recurrent iron deficiency anemia.  Will plan to continue to follow labs for at least 1 year past her last IV iron infusion.    -repeat CBC and ferritin in 6 months    *Irregular menorrhagia  -Likely the cause of her iron deficiency anemia  -mirena IUD placed 5/3/23 with improvement  -following with gynecology  -previously had partial improvement with tranexamic acid    RTC in 6 months    Surya Burns MD  Hematology/Medical Oncology  [facility]

## 2024-02-06 NOTE — PROGRESS NOTES
Education Record     Learner:  Patient     Disease / Diagnosis: HOMA, thrombocytosis     Barriers / Limitations:  None                Comments:     Method:  Discussion                Comments:     General Topics:  Plan of care reviewed                Comments:     Outcome:  Shows understanding                Comments: pt states she has been feeling well and her energy is good. States she has not needed to take tranexamic acid. States had IUD placed in may.

## 2024-04-04 ENCOUNTER — PATIENT MESSAGE (OUTPATIENT)
Dept: FAMILY MEDICINE CLINIC | Facility: CLINIC | Age: 44
End: 2024-04-04

## 2024-04-05 NOTE — TELEPHONE ENCOUNTER
From: Mary Botello  To: Radha Queen  Sent: 4/4/2024 9:38 PM CDT  Subject: Left Arm    I am experiencing limited mobility in my left arm. I am experiencing shooting pains in my arm during my sleep if I lay on my left side. Can I schedule an appointment for next week or do I need to call same day to see if there is an appointment available??

## 2024-04-08 ENCOUNTER — OFFICE VISIT (OUTPATIENT)
Dept: FAMILY MEDICINE CLINIC | Facility: CLINIC | Age: 44
End: 2024-04-08
Payer: COMMERCIAL

## 2024-04-08 VITALS
SYSTOLIC BLOOD PRESSURE: 110 MMHG | BODY MASS INDEX: 29.25 KG/M2 | DIASTOLIC BLOOD PRESSURE: 70 MMHG | RESPIRATION RATE: 16 BRPM | HEIGHT: 66 IN | OXYGEN SATURATION: 98 % | HEART RATE: 64 BPM | WEIGHT: 182 LBS

## 2024-04-08 DIAGNOSIS — M24.812 INTERNAL DERANGEMENT OF LEFT SHOULDER: Primary | ICD-10-CM

## 2024-04-08 PROCEDURE — 3078F DIAST BP <80 MM HG: CPT | Performed by: NURSE PRACTITIONER

## 2024-04-08 PROCEDURE — 99214 OFFICE O/P EST MOD 30 MIN: CPT | Performed by: NURSE PRACTITIONER

## 2024-04-08 PROCEDURE — 3008F BODY MASS INDEX DOCD: CPT | Performed by: NURSE PRACTITIONER

## 2024-04-08 PROCEDURE — 3074F SYST BP LT 130 MM HG: CPT | Performed by: NURSE PRACTITIONER

## 2024-04-08 RX ORDER — DICLOFENAC SODIUM 75 MG/1
75 TABLET, DELAYED RELEASE ORAL 2 TIMES DAILY
Qty: 60 TABLET | Refills: 0 | Status: SHIPPED | OUTPATIENT
Start: 2024-04-08 | End: 2024-05-08

## 2024-04-08 RX ORDER — CYCLOBENZAPRINE HCL 10 MG
5 TABLET ORAL NIGHTLY
Qty: 30 TABLET | Refills: 0 | Status: SHIPPED | OUTPATIENT
Start: 2024-04-08

## 2024-04-08 NOTE — PROGRESS NOTES
Chief Complaint   Patient presents with    Arm Pain     Left arm pain         HPI: L shoulder has been hurting for 1 month .Dull, throbbing  in character. Radiation to the upper L arm . Rates pain 10 /10.  With shooting pain and during the day 3/10 No weakness.  No numbness or tingling. Worsening. Movement makes it worse and rest makes it better.Rasing movement above the shoulder line is very uncomfortable for the pt.  Injury mechanism: no injury but lifting at work   No redness, bruising, lacerations.No weakness.  Current medications: no   H/o surgery of the shoulder: no       No current outpatient medications on file.      Past Medical History:   Diagnosis Date    Anxiety state, unspecified     Iron deficiency anemia secondary to blood loss (chronic)     Menorrhagia with irregular cycle     Ovarian cyst     Panic attack       Past Surgical History:   Procedure Laterality Date              LEEP      normal pap smears to follow     TUBAL LIGATION      performed at same time of CS      Social History:   Social History     Socioeconomic History    Marital status:    Tobacco Use    Smoking status: Never    Smokeless tobacco: Never   Vaping Use    Vaping Use: Never used   Substance and Sexual Activity    Alcohol use: Yes     Comment: socially, no hx of excessive use    Drug use: No    Sexual activity: Yes     Partners: Male   Other Topics Concern    Caffeine Concern Yes     Comment: coffee 1 cup daily    Exercise No    Seat Belt Yes   Social History Narrative    , has 4 children who live with her ages 14, 14, 21, and 22 (as of 2023).  She works for [employer].               ROS:  GEN: no fever, no chills, no fatigue  CHEST: no chest pains.  SKIN: no rashes  HEM: no ecchymoses  JOINTS: no other joints pain.  NEURO: no tingling, no weakness, no abnormal sensation.      /70   Pulse 64   Resp 16   Ht 5' 6\" (1.676 m)   Wt 182 lb (82.6 kg)   LMP 2023 (Exact Date)   SpO2 98%    BMI 29.38 kg/m²     PE:  Gen:  WD/WN NAD  HEENT:  PEERL, EOM-i.  LUNGS:CTA rere.  HEART:S1/S2 reg., no murmurs, clicks, gallops  SKIN:no rashes on the chest or back.  Back:  Normal on inspection, no pain on palpation of the spinal and paraspinal muscles.   Neuro:  Reflexes at knees 2+ and symmetric  L shoulder: limited extension to 90 degrees, limited  extension, internal and external rotation normal, mckeon and neers negative, empty can testing negative. DTR and STR of limb wnl.      A/P  Diagnoses and all orders for this visit:    Internal derangement of left shoulder  -     XR SHOULDER, COMPLETE (MIN 2 VIEWS), LEFT (CPT=73030); Future  -     cyclobenzaprine 10 MG Oral Tab; Take 0.5 tablets (5 mg total) by mouth nightly.  -     diclofenac 75 MG Oral Tab EC; Take 1 tablet (75 mg total) by mouth 2 (two) times daily.  -     Ortho Referral - In Network  -     PHYSICAL THERAPY EXTERNAL  Stable   F/u for worsening symptoms

## 2024-04-09 ENCOUNTER — HOSPITAL ENCOUNTER (OUTPATIENT)
Dept: GENERAL RADIOLOGY | Age: 44
Discharge: HOME | End: 2024-04-09
Attending: NURSE PRACTITIONER
Payer: COMMERCIAL

## 2024-04-09 DIAGNOSIS — M24.812 INTERNAL DERANGEMENT OF LEFT SHOULDER: ICD-10-CM

## 2024-04-09 PROCEDURE — 73030 X-RAY EXAM OF SHOULDER: CPT | Performed by: NURSE PRACTITIONER

## 2024-04-22 ENCOUNTER — OFFICE VISIT (OUTPATIENT)
Dept: ORTHOPEDICS CLINIC | Facility: CLINIC | Age: 44
End: 2024-04-22
Payer: COMMERCIAL

## 2024-04-22 VITALS — HEIGHT: 66 IN | WEIGHT: 182 LBS | BODY MASS INDEX: 29.25 KG/M2

## 2024-04-22 DIAGNOSIS — M75.02 ADHESIVE CAPSULITIS OF LEFT SHOULDER: Primary | ICD-10-CM

## 2024-04-22 RX ORDER — TRIAMCINOLONE ACETONIDE 40 MG/ML
40 INJECTION, SUSPENSION INTRA-ARTICULAR; INTRAMUSCULAR ONCE
Status: COMPLETED | OUTPATIENT
Start: 2024-04-22 | End: 2024-04-22

## 2024-04-22 RX ORDER — KETOROLAC TROMETHAMINE 30 MG/ML
30 INJECTION, SOLUTION INTRAMUSCULAR; INTRAVENOUS ONCE
Status: COMPLETED | OUTPATIENT
Start: 2024-04-22 | End: 2024-04-22

## 2024-04-22 RX ADMIN — KETOROLAC TROMETHAMINE 30 MG: 30 INJECTION, SOLUTION INTRAMUSCULAR; INTRAVENOUS at 08:35:00

## 2024-04-22 RX ADMIN — TRIAMCINOLONE ACETONIDE 40 MG: 40 INJECTION, SUSPENSION INTRA-ARTICULAR; INTRAMUSCULAR at 08:35:00

## 2024-04-22 NOTE — PROCEDURES
Left Shoulder Glenohumeral Joint Injection    Name: Mary Botello   MRN: [id number]  Date: 4/22/2024     Clinical Indications:   Adhesive Capsulitis.     After informed consent, the injection site was marked, sterilized with topical chlorhexidine antiseptic, and locally anesthetized with skin refrigerant.    The patient was seated upright and the shoulder was exposed. Using sterile technique: 1 mL of 30mg/mL of Ketorolac, 2 mL of 0.5% Bupivicaine, 2 mL of 1% Lidocaine, and 1 mL of 40 mg/ml Triamcinolonewas injected with a Anterior approach utilizing a 21 gauge needle.  A band-aid was applied.  The patient tolerated the procedure well.    Disposition:   Return to clinic on an as needed basis.           Sincer MARLENE De Luna, [facility], PA-C Orthopedic Surgery / Sports Medicine Specialist  [facility]  [address] [place] [address].org  [email or web address]  t: [phone]  o: [phone]  f: [phone]          This note was dictated using Dragon software.  While it was briefly proofread prior to completion, some grammatical, spelling, and word choice errors due to dictation may still occur.

## 2024-04-22 NOTE — H&P
[facility] - ORTHOPEDICS  [address] [place] [id number]  [phone]     NEW PATIENT VISIT - HISTORY AND PHYSICAL EXAMINATION     Name: Mary Botello   MRN: [id number]  Date: 2024     CC: Left shoulder pain.    REFERRED BY: Radha Queen MD    HPI:   Mary Botello is a very pleasant 43 year old right-hand dominant female who presents today for evaluation, consultation, and management of LEFT SHOULDER pain intermittently for a few months- that she attributed to overuse. She works for [employer] and has noted restricted range of motion. She followed up with her PCP who obtained an x-ray and referred to our service for further evaluation. She complains of pain at the left shoulder- intermittent and sharp. 8/10 pain.     PMH:   Past Medical History:    Anxiety state, unspecified    Iron deficiency anemia secondary to blood loss (chronic)    Menorrhagia with irregular cycle    Ovarian cyst    Panic attack       PAST SURGICAL HX:  Past Surgical History:   Procedure Laterality Date              Leep      normal pap smears to follow     Tubal ligation      performed at same time of CS       FAMILY HX:  Family History   Problem Relation Age of Onset    Diabetes Mother     Hypertension Mother     Anemia Mother     Other (hypothyroid) Mother     Other (anxiety) Mother     Other (depression) Mother     Prostate Cancer Father 72    Heart Disorder Maternal Grandmother     Other (Other) Maternal Grandfather     Other (liver dz) Maternal Grandfather     Ovarian Cancer Paternal Aunt         60's       ALLERGIES:  Dust    MEDICATIONS:   Current Outpatient Medications   Medication Sig Dispense Refill    cyclobenzaprine 10 MG Oral Tab Take 0.5 tablets (5 mg total) by mouth nightly. 30 tablet 0    diclofenac 75 MG Oral Tab EC Take 1 tablet (75 mg total) by mouth 2 (two) times daily. 60 tablet 0       ROS: A comprehensive 14 point review of systems was performed and was  negative aside from the aforementioned per history of present illness.    SOCIAL HX:  Social History     Occupational History    Not on file   Tobacco Use    Smoking status: Never    Smokeless tobacco: Never   Vaping Use    Vaping status: Never Used   Substance and Sexual Activity    Alcohol use: Yes     Comment: socially, no hx of excessive use    Drug use: No    Sexual activity: Yes     Partners: Male        PE:   Vitals:    04/22/24 0817   Weight: 182 lb (82.6 kg)   Height: 5' 6\" (1.676 m)     Estimated body mass index is 29.38 kg/m² as calculated from the following:    Height as of this encounter: 5' 6\" (1.676 m).    Weight as of this encounter: 182 lb (82.6 kg).    Physical Exam  Constitutional:       Appearance: Normal appearance.   HENT:      Head: Normocephalic and atraumatic.   Eyes:      Extraocular Movements: Extraocular movements intact.   Neck:      Musculoskeletal: Normal range of motion and neck supple.   Cardiovascular:      Pulses: Normal pulses.   Pulmonary:      Effort: Pulmonary effort is normal. No respiratory distress.   Abdominal:      General: There is no distension.   Skin:     General: Skin is warm.      Capillary Refill: Capillary refill takes less than 2 seconds.      Findings: No bruising.   Neurological:      General: No focal deficit present.      Mental Status: Alert.   Psychiatric:         Mood and Affect: Mood normal.     Examination of the left shoulder demonstrates:     Skin is intact, warm and dry.   Cervical:  Full ROM  Spurling's  Negative    Deformity:   none  Atrophy:   none    Scapular winging: Negative    Palpation:     AC Joint  Negative  Biceps Tendon  Positive  Greater Tuberosity Positive    ROM:   Forward Flexion:  110  Abduction:   full and symmetric  External Rotation:  30°  Internal Rotation:  sacrum    Rotator Cuff Strength:   Supraspinatus:   5/5  Subscapularis:   5/5  Infraspinatus/Teres: 5/5    Provocative Tests:   Mendez:   Positive  Speed's:    Negative  Haywood's:   Negative  Lift-off:   Negative  Apprehension:  Negative  Sulcus Sign:   Negative    Neurovascular Upper Extremity (Bilateral)  Motor:    5/5 EPL, Finger Abduction, , Pinch, Deltoid  Sensation:   intact to light touch median, ulnar, radial and axillary nerve  Circulation:   Normal, 2+ radial pulse    The contralateral upper extremity is without limitation in range of motion or strength, no positive provocative maneuvers.     Radiographic Examination/Diagnostics:    I personally viewed, independently interpreted and radiology report was reviewed.      XR SHOULDER, COMPLETE (MIN 2 VIEWS), LEFT (CPT=73030)    Result Date: 4/9/2024  PROCEDURE:  XR SHOULDER, COMPLETE (MIN 2 VIEWS), LEFT (CPT=73030)  TECHNIQUE:  Multiple views were obtained.  COMPARISON:  None.  INDICATIONS:  M24.812 Internal derangement of left shoulder  PATIENT STATED HISTORY: (As transcribed by Technologist)  Pain with arm abduction and reaching behind the back x1 month. Pain associated with overuse at work, no specific injury.               CONCLUSION:   Negative for glenohumeral fracture or malalignment.  Normal glenohumeral and acromioclavicular joints with preservation of the subacromial interval.  Mineralization along the greater tuberosity most in keeping with calcific tendinitis of the rotator cuff.  The visualized bony thorax and regional soft tissues are within normal limits.    LOCATION:  [id number]   Dictated by (CST): Dennis Curtis MD on 4/09/2024 at 2:23 PM     Finalized by (CST): Dennis Curtis MD on 4/09/2024 at 2:24 PM         IMPRESSION: Mary Botello is a 43 year old Right hand dominant female left shoulder adhesive capsulitis.     PLAN:   We had a detailed discussion outlining the etiology, anatomy, pathophysiology, and natural history of the patient's findings. Imaging was reviewed in detail and correlated to a 3-dimensional model of the patient's pathology.     We reviewed the treatment of this disease  condition.  Fortunately, treatment is amenable to conservative treatment which we chose to optimize at today's visit.     After a discussion of a variety of conservative treatment options we elected to proceed with the injection procedure at today's visit. We discussed the risk and benefits of the procedure, including, but not limited to: infection, injury to blood vessels, nerve injury, prolonged pain, swelling, site soreness, failure to progress, and need for advanced treatments.  The patient voiced understanding and agreed to proceed with the treatment plan.     We recommended physical therapy to aid in strengthening, range of motion, functional improvement, and return to baseline activity.  The patient had the opportunity to ask questions and all questions were answered appropriately.      FOLLOW-UP:   6 weeks, or as needed.           Jose De Luna [facility], PA-C Orthopedic Surgery / Sports Medicine Specialist  [facility]  [address] [place] [address].org  [email or web address]  t: [phone]  o: [phone]  f: [phone]    This note was dictated using Dragon software.  While it was briefly proofread prior to completion, some grammatical, spelling, and word choice errors due to dictation may still occur.

## 2024-05-14 ENCOUNTER — LAB ENCOUNTER (OUTPATIENT)
Dept: LAB | Age: 44
End: 2024-05-14
Attending: FAMILY MEDICINE

## 2024-05-14 ENCOUNTER — OFFICE VISIT (OUTPATIENT)
Dept: FAMILY MEDICINE CLINIC | Facility: CLINIC | Age: 44
End: 2024-05-14

## 2024-05-14 VITALS
WEIGHT: 184 LBS | SYSTOLIC BLOOD PRESSURE: 112 MMHG | BODY MASS INDEX: 29.57 KG/M2 | HEIGHT: 66 IN | DIASTOLIC BLOOD PRESSURE: 70 MMHG | OXYGEN SATURATION: 99 % | TEMPERATURE: 98 F | RESPIRATION RATE: 18 BRPM

## 2024-05-14 DIAGNOSIS — Z00.00 LABORATORY EXAMINATION ORDERED AS PART OF A ROUTINE GENERAL MEDICAL EXAMINATION: ICD-10-CM

## 2024-05-14 DIAGNOSIS — Z00.00 ROUTINE GENERAL MEDICAL EXAMINATION AT A HEALTH CARE FACILITY: Primary | ICD-10-CM

## 2024-05-14 DIAGNOSIS — Z12.31 ENCOUNTER FOR SCREENING MAMMOGRAM FOR MALIGNANT NEOPLASM OF BREAST: ICD-10-CM

## 2024-05-14 DIAGNOSIS — Z23 NEED FOR TDAP VACCINATION: ICD-10-CM

## 2024-05-14 LAB
ALBUMIN SERPL-MCNC: 3.8 G/DL (ref 3.4–5)
ALBUMIN/GLOB SERPL: 1.2 {RATIO} (ref 1–2)
ALP LIVER SERPL-CCNC: 45 U/L
ALT SERPL-CCNC: 18 U/L
ANION GAP SERPL CALC-SCNC: 8 MMOL/L (ref 0–18)
AST SERPL-CCNC: 9 U/L (ref 15–37)
BILIRUB SERPL-MCNC: 0.8 MG/DL (ref 0.1–2)
BUN BLD-MCNC: 11 MG/DL (ref 9–23)
CALCIUM BLD-MCNC: 8.5 MG/DL (ref 8.5–10.1)
CHLORIDE SERPL-SCNC: 106 MMOL/L (ref 98–112)
CHOLEST SERPL-MCNC: 179 MG/DL (ref ?–200)
CO2 SERPL-SCNC: 26 MMOL/L (ref 21–32)
CREAT BLD-MCNC: 0.51 MG/DL
EGFRCR SERPLBLD CKD-EPI 2021: 119 ML/MIN/1.73M2 (ref 60–?)
FASTING PATIENT LIPID ANSWER: YES
FASTING STATUS PATIENT QL REPORTED: YES
GLOBULIN PLAS-MCNC: 3.3 G/DL (ref 2.8–4.4)
GLUCOSE BLD-MCNC: 87 MG/DL (ref 70–99)
HDLC SERPL-MCNC: 70 MG/DL (ref 40–59)
LDLC SERPL CALC-MCNC: 93 MG/DL (ref ?–100)
NONHDLC SERPL-MCNC: 109 MG/DL (ref ?–130)
OSMOLALITY SERPL CALC.SUM OF ELEC: 289 MOSM/KG (ref 275–295)
POTASSIUM SERPL-SCNC: 3.6 MMOL/L (ref 3.5–5.1)
PROT SERPL-MCNC: 7.1 G/DL (ref 6.4–8.2)
SODIUM SERPL-SCNC: 140 MMOL/L (ref 136–145)
TRIGL SERPL-MCNC: 85 MG/DL (ref 30–149)
TSI SER-ACNC: 0.76 MIU/ML (ref 0.36–3.74)
VIT D+METAB SERPL-MCNC: 9.1 NG/ML (ref 30–100)
VLDLC SERPL CALC-MCNC: 14 MG/DL (ref 0–30)

## 2024-05-14 PROCEDURE — 90715 TDAP VACCINE 7 YRS/> IM: CPT | Performed by: FAMILY MEDICINE

## 2024-05-14 PROCEDURE — 82306 VITAMIN D 25 HYDROXY: CPT | Performed by: FAMILY MEDICINE

## 2024-05-14 PROCEDURE — 84443 ASSAY THYROID STIM HORMONE: CPT | Performed by: FAMILY MEDICINE

## 2024-05-14 PROCEDURE — 80061 LIPID PANEL: CPT | Performed by: FAMILY MEDICINE

## 2024-05-14 PROCEDURE — 99396 PREV VISIT EST AGE 40-64: CPT | Performed by: FAMILY MEDICINE

## 2024-05-14 PROCEDURE — 3008F BODY MASS INDEX DOCD: CPT | Performed by: FAMILY MEDICINE

## 2024-05-14 PROCEDURE — 3074F SYST BP LT 130 MM HG: CPT | Performed by: FAMILY MEDICINE

## 2024-05-14 PROCEDURE — 90471 IMMUNIZATION ADMIN: CPT | Performed by: FAMILY MEDICINE

## 2024-05-14 PROCEDURE — 3078F DIAST BP <80 MM HG: CPT | Performed by: FAMILY MEDICINE

## 2024-05-14 PROCEDURE — 80053 COMPREHEN METABOLIC PANEL: CPT | Performed by: FAMILY MEDICINE

## 2024-05-14 NOTE — PROGRESS NOTES
Mary Botello is a 43 year old female who presents for a complete physical exam, no gyn.  HPI:     Chief Complaint   Patient presents with    Physical       Patient feels well, dental visit up to date, no hearing problem.  Vaccinations: Needs Tdap.  Exercise: three times per week.  Diet: watches sugar closely    Wt Readings from Last 3 Encounters:   24 184 lb (83.5 kg)   24 182 lb (82.6 kg)   24 182 lb (82.6 kg)      BP Readings from Last 3 Encounters:   24 112/70   24 110/70   24 113/71     No LMP recorded. (Menstrual status: Irregular Periods).         No current outpatient medications on file.      Past Medical History:    Allergic rhinitis    Anxiety state, unspecified    Iron deficiency anemia secondary to blood loss (chronic)    Menorrhagia with irregular cycle    Obesity    Ovarian cyst    Panic attack      Past Surgical History:   Procedure Laterality Date              [facility]  2006    normal pap smears to follow        &     Tubal ligation  2008    performed at same time of CS      Family History   Problem Relation Age of Onset    Diabetes Mother     Hypertension Mother     Anemia Mother     Other (hypothyroid) Mother     Other (anxiety) Mother     Other (depression) Mother     Asthma Mother     Depression Mother     Obesity Mother     Prostate Cancer Father 72    Hypertension Father     Heart Disorder Maternal Grandmother     Hypertension Maternal Grandmother     Obesity Maternal Grandmother     Stroke Maternal Grandmother     Other (Other) Maternal Grandfather     Other (liver dz) Maternal Grandfather     Diabetes Maternal Grandfather     Ovarian Cancer Paternal Aunt         60's      Social History     Socioeconomic History    Marital status:    Tobacco Use    Smoking status: Never    Smokeless tobacco: Never   Vaping Use    Vaping status: Never Used   Substance and Sexual Activity    Alcohol use: Yes     Comment: Socially    Drug  use: No    Sexual activity: Yes     Partners: Male   Other Topics Concern    Caffeine Concern Yes    Stress Concern Yes    Weight Concern Yes    Special Diet No    Exercise No    Seat Belt Yes   Social History Narrative    , has 4 children who live with her ages 14, 14, 21, and 22 (as of 2/2023).  She works for [employer].        REVIEW OF SYSTEMS:   GENERAL HEALTH: feels well, no fatigue.  SKIN: denies any unusual skin lesions or rashes  EYES: no visual complaints or deficits  HEENT: denies nasal congestion, sinus pain or sore throat; hearing loss negative   RESPIRATORY: denies shortness of breath, wheezing or cough   CARDIOVASCULAR: denies chest pain, SOB, edema,orthopnea, no palpitations   GI: denies nausea, vomiting, constipation, diarrhea; no rectal bleeding; no heartburn  GENITAL/: no dysuria, urgency or frequency  MUSCULOSKELETAL: no joint complaints upper or lower extremities  NEURO: no sensory or motor complaint  HEMATOLOGY: denies hx anemia; denies bruising or excessive bleeding  ENDOCRINE: denies excessive thirst or urination; denies unexpected wt gain or wt loss  ALLERGY/IMM.: denies food or seasonal allergies  PSYCH: no symptoms of depression or anxiety      EXAM:   /70   Temp 98.2 °F (36.8 °C) (Oral)   Resp 18   Ht 5' 6\" (1.676 m)   Wt 184 lb (83.5 kg)   SpO2 99%   BMI 29.70 kg/m²      General: WD/WN in no acute distress.   HEENT: PERRLA and EOMI.  OP moist no lesions.  Neck is supple, with no cervical LAD or thyroid abnormalities. No carotid bruits.    Lungs: are clear to auscultation bilaterally, with no wheeze, rhonchi, or rales.   Heart: is RRR.  S1, S2, with no murmurs,clicks, gallops  Abdomen: is soft,NBS, NT/ND with no HSM.  No rebound or guarding. No CVA tenderness, no hernias.  Neuro: Cranial nerves II-XII normal,no focal abnormalities, and reflexes coordination and gait normal and symmetric.Sensation intact.  Extremities: are symmetric with no cyanosis, clubbing, or  edema.  MS: Normal muscles tones, no joints abnormalities.  SKIN: Normal color, turgor, no lesions, rashes or wounds.  PSYCH: Normal affect and mood.          ASSESSMENT AND PLAN:     Mary Botello is a 43 year old female who presents for a complete physical exam.   Pt's was recommended low fat diet and aerobic exercise 30 minutes three times weekly.       Screening for endocrine, nutritional, metabolic and immunity disorder  -     CBC WITH DIFFERENTIAL WITH PLATELET; Future  -     COMP METABOLIC PANEL (14); Future  -     LIPID PANEL; Future  -     TSH W REFLEX TO FREE T4; Future  -     VITAMIN D, 25-HYDROXY; Future    Visit for screening mammogram  -     [facility] JEANINE 2D+3D SCREENING BILAT (CPT=77067/20906); Future      Gyn exam up to date.  The patient indicates understanding of these issues and agrees to the plan.  The patient is asked to return for CPX in 1 year.

## 2024-05-16 RX ORDER — ERGOCALCIFEROL 1.25 MG/1
50000 CAPSULE ORAL WEEKLY
Qty: 4 CAPSULE | Refills: 2 | Status: SHIPPED | OUTPATIENT
Start: 2024-05-16 | End: 2024-06-15

## 2024-06-03 ENCOUNTER — HOSPITAL ENCOUNTER (OUTPATIENT)
Dept: MAMMOGRAPHY | Age: 44
Discharge: HOME | End: 2024-06-03
Attending: FAMILY MEDICINE
Payer: COMMERCIAL

## 2024-06-03 DIAGNOSIS — Z12.31 ENCOUNTER FOR SCREENING MAMMOGRAM FOR MALIGNANT NEOPLASM OF BREAST: ICD-10-CM

## 2024-06-03 PROCEDURE — 77063 BREAST TOMOSYNTHESIS BI: CPT | Performed by: FAMILY MEDICINE

## 2024-06-03 PROCEDURE — 77067 SCR MAMMO BI INCL CAD: CPT | Performed by: FAMILY MEDICINE

## 2024-06-28 ENCOUNTER — MED REC SCAN ONLY (OUTPATIENT)
Dept: ORTHOPEDICS CLINIC | Facility: CLINIC | Age: 44
End: 2024-06-28

## 2024-08-06 ENCOUNTER — APPOINTMENT (OUTPATIENT)
Dept: HEMATOLOGY/ONCOLOGY | Age: 44
End: 2024-08-06
Attending: INTERNAL MEDICINE
Payer: COMMERCIAL

## 2024-09-10 ENCOUNTER — OFFICE VISIT (OUTPATIENT)
Dept: HEMATOLOGY/ONCOLOGY | Age: 44
End: 2024-09-10
Attending: INTERNAL MEDICINE
Payer: COMMERCIAL

## 2024-09-10 VITALS
OXYGEN SATURATION: 100 % | DIASTOLIC BLOOD PRESSURE: 64 MMHG | TEMPERATURE: 98 F | SYSTOLIC BLOOD PRESSURE: 99 MMHG | RESPIRATION RATE: 18 BRPM | BODY MASS INDEX: 31 KG/M2 | HEART RATE: 60 BPM | WEIGHT: 193.5 LBS

## 2024-09-10 DIAGNOSIS — N92.1 MENORRHAGIA WITH IRREGULAR CYCLE: ICD-10-CM

## 2024-09-10 DIAGNOSIS — D50.0 IRON DEFICIENCY ANEMIA SECONDARY TO BLOOD LOSS (CHRONIC): Primary | ICD-10-CM

## 2024-09-10 LAB
BASOPHILS # BLD AUTO: 0.03 X10(3) UL (ref 0–0.2)
BASOPHILS NFR BLD AUTO: 0.4 %
DEPRECATED HBV CORE AB SER IA-ACNC: 81.7 NG/ML
EOSINOPHIL # BLD AUTO: 0.39 X10(3) UL (ref 0–0.7)
EOSINOPHIL NFR BLD AUTO: 5.1 %
ERYTHROCYTE [DISTWIDTH] IN BLOOD BY AUTOMATED COUNT: 12.5 %
HCT VFR BLD AUTO: 38.5 %
HGB BLD-MCNC: 12.7 G/DL
IMM GRANULOCYTES # BLD AUTO: 0.02 X10(3) UL (ref 0–1)
IMM GRANULOCYTES NFR BLD: 0.3 %
LYMPHOCYTES # BLD AUTO: 2.31 X10(3) UL (ref 1–4)
LYMPHOCYTES NFR BLD AUTO: 30.4 %
MCH RBC QN AUTO: 29.2 PG (ref 26–34)
MCHC RBC AUTO-ENTMCNC: 33 G/DL (ref 31–37)
MCV RBC AUTO: 88.5 FL
MONOCYTES # BLD AUTO: 0.68 X10(3) UL (ref 0.1–1)
MONOCYTES NFR BLD AUTO: 9 %
NEUTROPHILS # BLD AUTO: 4.16 X10 (3) UL (ref 1.5–7.7)
NEUTROPHILS # BLD AUTO: 4.16 X10(3) UL (ref 1.5–7.7)
NEUTROPHILS NFR BLD AUTO: 54.8 %
PLATELET # BLD AUTO: 326 10(3)UL (ref 150–450)
RBC # BLD AUTO: 4.35 X10(6)UL
WBC # BLD AUTO: 7.6 X10(3) UL (ref 4–11)

## 2024-09-10 PROCEDURE — 99214 OFFICE O/P EST MOD 30 MIN: CPT | Performed by: INTERNAL MEDICINE

## 2024-09-10 NOTE — PROGRESS NOTES
Hematology Clinic Follow Up Visit    Patient Name: Mary Botello  Medical Record Number: [id number]   YOB: 1980   PCP: Radha Queen MD   Other providers: Dr. Reema Herrera (gyn)    Reason for Consultation:  Mary Botello was seen today for the diagnosis of iron deficiency anemia    Hematologic History:  *HOMA  -13- wbc 6.1, hgb 11.1, MCV 82, plt 363  -10/4/16- wbc 11.8, hgb 13.0, MCV 86, plt 437, ferritin 18, TSAT 11%  -18- wbc 6.6, hgb 10.9, MCV 85, plt 433  -19- wbc 7.7, hgb 10.2, MCV 82, plt 376  -3/7/20- wbc 6.4, hgb 10.4, MCV 83, plt 385  -21- wbc 6.7, hgb 11.1, MCV 81, plt 404  -22- B12 349, normal monoclonal protein study  -22- wbc 6.5, hgb 10.9, MCV 83, plt 392, ferritin 5, TSAT 8%  -23- wbc 6.8, hgb 9.6, MCV 76, plt 570, ferritin 3, TSAT 3%  -23- IV INFeD 1000mg  -3/27/23- wbc 5.0, hgb 11.2, MCV 82, plt 380, ferritin 68  -5/3/23- mirena IUD placed  -23- wbc 8.2, hgb 11.3, MCV 83, plt 382, ferritin 14, TSAT 11%  -23- IV monoferric 1000mg  -23- wbc 5.6, hgb 12.3, MCV 91, plt 322, ferritin 120  -24- wbc 7.7, hgb 12.8, MCV 87, plt 361, ferritin 181  -9/10/24- wbc 7.6, hgb 12.7, MCV 88, plt 326, ferritin 81    ===============================  Interval events: Presents for follow-up.  Patient reports feeling well.  Her energy is normal.  She denies any recent dyspnea, lightheadedness, or chest pain.  Since her Mirena IUD was placed 2023 her menorrhagia has improved.  She now only has mild spotting once per month.  She denies any other bleeding or dark stools.  She has never had an EGD or colonoscopy.  She never had pica or restless leg syndrome.    Past Medical History:  Past Medical History:    Allergic rhinitis    Anxiety state, unspecified    Iron deficiency anemia secondary to blood loss (chronic)    Menorrhagia with irregular cycle    Obesity    Ovarian cyst    Panic attack     OB/GYN history: , 1 miscarriage, 1  , 3 term deliveries- , , . She has a history of irregular menorrhagia since delivering her twins in .  She did not have menorrhagia at menarche.  Her menses tend to occur less often than normal, over the last few years her menses occur about 8 times per year.  Her menstrual periods last 7 to 14 days, on her heaviest day has to change a pad + tampon q1 hrs. she has up to 10 heavy days during her menstrual period.  Has previously tried OCP that did not help slow bleeding.  She passes clots.  +prior pelvic ultrasound ovarian cysts.  Mirena IUD placed 5/3/23.    Past Surgical History:   Procedure Laterality Date              Leep      normal pap smears to follow        &     Tubal ligation  2008    performed at same time of CS     Home Medications:  No outpatient medications have been marked as taking for the 9/10/24 encounter (Office Visit) with Surya Burns MD.     Allergies:   Allergies   Allergen Reactions    Dust OTHER (SEE COMMENTS)     Runny nose, sneezing       Psychosocial History:  Social History     Social History Narrative    , has 4 children who live with her ages 14, 14, 21, and 22 (as of 2023).  She works for [employer].     Social History     Socioeconomic History    Marital status:    Tobacco Use    Smoking status: Never    Smokeless tobacco: Never   Vaping Use    Vaping status: Never Used   Substance and Sexual Activity    Alcohol use: Yes     Comment: Socially    Drug use: No    Sexual activity: Yes     Partners: Male   Other Topics Concern    Caffeine Concern Yes    Stress Concern Yes    Weight Concern Yes    Special Diet No    Exercise No    Seat Belt Yes   Social History Narrative    , has 4 children who live with her ages 14, 14, 21, and 22 (as of 2023).  She works for [employer].     Family Medical History:  Family History   Problem Relation Age of Onset    Diabetes Mother     Hypertension Mother     Anemia Mother      Other (hypothyroid) Mother     Other (anxiety) Mother     Other (depression) Mother     Asthma Mother     Depression Mother     Obesity Mother     Prostate Cancer Father 72    Hypertension Father     Heart Disorder Maternal Grandmother     Hypertension Maternal Grandmother     Obesity Maternal Grandmother     Stroke Maternal Grandmother     Other (Other) Maternal Grandfather     Other (liver dz) Maternal Grandfather     Diabetes Maternal Grandfather     Ovarian Cancer Paternal Aunt         60's   Mother and sister both had heavy menstrual bleeding and required hysterectomy.      Review of Systems:  A 10-point ROS was done with pertinent positives and negative per the HPI    Vital Signs:  Height: --  Weight: 87.8 kg (193 lb 8 oz) (09/10 0909)  BSA (Calculated - sq m): --  Pulse: 60 (09/10 0909)  BP: 99/64 (09/10 0909)  Temp: 97.7 °F (36.5 °C) (09/10 0909)  Do Not Use - Resp Rate: --  SpO2: 100 % (09/10 0909)    Wt Readings from Last 6 Encounters:   09/10/24 87.8 kg (193 lb 8 oz)   05/14/24 83.5 kg (184 lb)   04/22/24 82.6 kg (182 lb)   04/08/24 82.6 kg (182 lb)   02/06/24 82 kg (180 lb 12.8 oz)   05/30/23 86 kg (189 lb 8 oz)     Physical Examination:  General: Patient is alert and oriented, not in acute distress  Psych: Mood and affect are appropriate  Eyes: EOMI  ENT: Oropharynx is clear  CV: Regular rate and rhythm, no murmurs, no LE edema  Respiratory: Lungs clear to auscultation bilaterally  GI/Abd: Soft, non-tender with normoactive bowel sounds, no hepatosplenomegaly  Neurological: Grossly intact   Lymphatics: No palpable lymphadenopathy  Skin: no rashes or petechiae    Laboratory:  Lab Results   Component Value Date    WBC 7.6 09/10/2024    WBC 7.7 02/06/2024    WBC 5.6 11/22/2023    HGB 12.7 09/10/2024    HGB 12.8 02/06/2024    HGB 12.3 11/22/2023    HCT 38.5 09/10/2024    MCV 88.5 09/10/2024    MCH 29.2 09/10/2024    MCHC 33.0 09/10/2024    RDW 12.5 09/10/2024    .0 09/10/2024    .0 02/06/2024     .0 11/22/2023     Lab Results   Component Value Date    GLU 87 05/14/2024    BUN 11 05/14/2024    BUNCREA 15.9 12/19/2019    CREATSERUM 0.51 (L) 05/14/2024    CREATSERUM 0.68 01/17/2023    CREATSERUM 0.70 09/22/2022    ANIONGAP 8 05/14/2024     10/04/2016    GFRNAA 112 12/06/2021    GFRAA 129 12/06/2021    CA 8.5 05/14/2024    OSMOCALC 289 05/14/2024    ALKPHO 45 05/14/2024    AST 9 (L) 05/14/2024    ALT 18 05/14/2024    BILT 0.8 05/14/2024    TP 7.1 05/14/2024    ALB 3.8 05/14/2024    GLOBULIN 3.3 05/14/2024     05/14/2024    K 3.6 05/14/2024     05/14/2024    CO2 26.0 05/14/2024     No results found for: \"PTT\", \"PT\", \"INR\"    Lab Results   Component Value Date    REITCPERCENT 1.2 05/23/2023     Lab Results   Component Value Date    RETHE 33.2 05/23/2023     Lab Results   Component Value Date    RONAK 81.7 09/10/2024    RONAK 181.2 02/06/2024    RONAK 120.9 11/22/2023    RONAK 14.6 05/23/2023    RONAK 68.5 03/27/2023     Lab Results   Component Value Date    SAT 11 (L) 05/23/2023    SAT 3 (L) 01/17/2023    SAT 8 (L) 09/22/2022    SAT 11 (L) 10/04/2016     Soluble transferrin receptor  No results found for: \"STFRNR\"  Lab Results   Component Value Date    B12 312 05/23/2023    B12 349 02/01/2022     Lab Results   Component Value Date     05/23/2023     No results found for: \"FOLIC\"  No results found for: \"COPPER\"  No results found for: \"ESRML\"  No results found for: \"CRP\"  No results found for: \"LDH\"  No results found for: \"HAPT\"  No results found for: \"ELECTMS1\"  Lab Results   Component Value Date    KAPPALTCHN 1.656 02/01/2022      Lab Results   Component Value Date    LAMBDALTCH 1.721 02/01/2022     Lab Results   Component Value Date    KAPLAMRATIO 0.96 02/01/2022     Lab Results   Component Value Date    TSH 0.764 05/14/2024    TSH 0.745 01/17/2023    TSH 0.823 12/06/2021    TSH 0.877 03/07/2020    TSH 1.060 12/19/2019    TSH 0.587 09/27/2018    TSH 2.120 10/04/2016    TSH 1.260  08/28/2013     Lab Results   Component Value Date    T4F 1.1 12/19/2019     No results found for: \"T3TOT\"    Imaging:    US pelvis 3/11/20: FINDINGS:                UTERUS:  10.03 cm x 4.31 cm x 6.39 cm     Endometrium Thickness: 1.30 cm     The uterus appears normal in size, shape, and echogenicity.  Probable nabothian cyst.  This has a somewhat complex appearance with internal echogenicity measuring 1.7 x 1.4 x 1.5 cm.  This is stable from the prior exam.   RIGHT OVARY:  2.12 cm x 1.14 cm x 1.43 cm     The right ovary appears normal in size, shape, and echogenicity. No significant masses are identified.   LEFT OVARY:  2.51 cm x 1.57 cm x 1.47 cm     The previously identified left ovarian cyst is not appreciated on today's exam.   CUL-DE-SAC:  Normal.  No fluid or mass.     OTHER:  Negative.     CONCLUSION:  A previously identified left ovarian cyst is no longer appreciated.  No free fluid is seen.  Possible complex nabothian cyst which has an overall stable appearance.     Impression & Plan:     *Iron deficiency anemia- likely related to menorrhagia.    -Intolerant to PO iron supplementation.  Previously improved with IV iron infusion.    -Today her hemoglobin and iron stores remain normal, now more than 1 year since her last IV iron infusion.  -Since a Mirena IUD was placed her menorrhagia has resolved which was the cause of her excessive iron/blood loss    *Irregular menorrhagia  -Likely the cause of her iron deficiency anemia  -mirena IUD placed 5/3/23 with improvement  -following with gynecology  -previously had partial improvement with tranexamic acid    RTC prn- No further routine hematology clinic follow-up is needed.    Surya Burns MD  Hematology/Medical Oncology  [facility]

## 2024-09-10 NOTE — PROGRESS NOTES
Education Record     Learner:  Patient     Disease / Diagnosis: HOMA, thrombocytosis     Barriers / Limitations:  None                Comments:     Method:  Discussion                Comments:     General Topics:  Plan of care reviewed                Comments:     Outcome:  Shows understanding                Comments: 6 month f.up. pt states she has been feeling well and her energy is good. IUD in place and only has light bleeding.

## 2025-03-05 ENCOUNTER — OFFICE VISIT (OUTPATIENT)
Dept: FAMILY MEDICINE CLINIC | Facility: CLINIC | Age: 45
End: 2025-03-05
Payer: COMMERCIAL

## 2025-03-05 VITALS
OXYGEN SATURATION: 99 % | BODY MASS INDEX: 31.34 KG/M2 | WEIGHT: 195 LBS | DIASTOLIC BLOOD PRESSURE: 64 MMHG | RESPIRATION RATE: 20 BRPM | HEART RATE: 55 BPM | SYSTOLIC BLOOD PRESSURE: 100 MMHG | HEIGHT: 66 IN

## 2025-03-05 DIAGNOSIS — L03.011 PARONYCHIA OF FINGER, RIGHT: Primary | ICD-10-CM

## 2025-03-05 PROCEDURE — 99213 OFFICE O/P EST LOW 20 MIN: CPT

## 2025-03-05 PROCEDURE — 3008F BODY MASS INDEX DOCD: CPT

## 2025-03-05 PROCEDURE — 3078F DIAST BP <80 MM HG: CPT

## 2025-03-05 PROCEDURE — 3074F SYST BP LT 130 MM HG: CPT

## 2025-03-05 RX ORDER — MUPIROCIN 20 MG/G
OINTMENT TOPICAL
Qty: 15 G | Refills: 0 | Status: SHIPPED | OUTPATIENT
Start: 2025-03-05

## 2025-03-05 NOTE — PROGRESS NOTES
Chief Complaint   Patient presents with    Finger Pain     Right middle finger, swelling and burning, pt states on Sunday she hit her finger inside of the dryer     HPI:   Mary Botello is a 44 year old female who presents today with a chief complaint of right middle finger pain.  Cause -not sure, but hit her finger inside dryer when doing laundry. Also had nails done last Friday  Onset - [place] 3/2/25  Location of pain - under the nailbed.  Pain described as - burning and swelling . Throbbing and pain getitng worse each day  Radiation - no  Pain is aggravated by movement, use, and typing  Pain is alleviated by  has not tried anythiing  Any prior injury to finger - no  Prior surgeries to finger - no   Care prior to arrival consisted of nothing, with no relief.         Current Outpatient Medications   Medication Sig Dispense Refill    mupirocin 2 % External Ointment Apply 1 Application topically 2 (two) times daily to finger for 5 days. 15 g 0      Past Medical History:    Allergic rhinitis    Anxiety state, unspecified    Iron deficiency anemia secondary to blood loss (chronic)    Menorrhagia with irregular cycle    Obesity    Ovarian cyst    Panic attack      Social History:  Social History     Socioeconomic History    Marital status:    Tobacco Use    Smoking status: Never    Smokeless tobacco: Never   Vaping Use    Vaping status: Never Used   Substance and Sexual Activity    Alcohol use: Yes     Comment: Socially    Drug use: No    Sexual activity: Yes     Partners: Male   Other Topics Concern    Caffeine Concern Yes    Stress Concern Yes    Weight Concern Yes    Special Diet No    Exercise No    Seat Belt Yes   Social History Narrative    , has 4 children who live with her ages 14, 14, 21, and 22 (as of 2/2023).  She works for [employer].          REVIEW OF SYSTEMS:   Review of Systems   Constitutional:  Negative for chills and fever.   Musculoskeletal:  Positive for joint pain and myalgias.    Skin:         See hpi   Neurological:  Negative for weakness and headaches.       EXAM:   /64   Pulse 55   Resp 20   Ht 5' 6\" (1.676 m)   Wt 195 lb (88.5 kg)   LMP  (LMP Unknown)   SpO2 99%   BMI 31.47 kg/m²   Physical Exam  Constitutional:       Appearance: Normal appearance.   HENT:      Head: Normocephalic and atraumatic.   Cardiovascular:      Rate and Rhythm: Normal rate and regular rhythm.   Pulmonary:      Effort: Pulmonary effort is normal.      Breath sounds: Normal breath sounds. No stridor. No wheezing, rhonchi or rales.   Chest:      Chest wall: No tenderness.   Musculoskeletal:         General: Swelling (right middle finger) and tenderness (right middle finger) present. No deformity or signs of injury. Normal range of motion.      Comments: Full rom of right middle finger   Skin:     General: Skin is warm.      Capillary Refill: Capillary refill takes less than 2 seconds.      Findings: Erythema present. No bruising or rash.      Comments: Right middle finger is erythremic and swollen underneath the nailbed. It appears to have a possible area of exudate where the skin is white.   Neurological:      Mental Status: She is alert and oriented to person, place, and time.   Psychiatric:         Mood and Affect: Mood normal.         Behavior: Behavior normal.         Thought Content: Thought content normal.         Judgment: Judgment normal.         ASSESSMENT AND PLAN:   1. Paronychia of finger, right  Suspect paronychia of the right middle finger. Advised mupirocin topical antibiotic to apply to the swollen/red area of the finger and advised warm water soaks to decrease swelling. The paronychia area appears along the nailbed edge itself seems too narrow to perform I&D at this time. Advised Mary Botello to go to the ER or contact our office if the following occurs:  Redness, pain, or swelling of the finger or toe gets worse  Trouble moving or bending the finger or toe  Red streaks in the  skin leading away from the wound  Pus or fluid draining from the nail area  Fever of 100.4ºF (38ºC) or higher, or as advised by your provider  Provided paronychia educational handout. Discussed with Mary Botello's PCP Dr. Queen who agreed with the plan of care.   - mupirocin 2 % External Ointment; Apply 1 Application topically 2 (two) times daily to finger for 5 days.  Dispense: 15 g; Refill: 0        Meds, Orders, & Refills for this Visit:  Requested Prescriptions     Signed Prescriptions Disp Refills    mupirocin 2 % External Ointment 15 g 0     Sig: Apply 1 Application topically 2 (two) times daily to finger for 5 days.         Imaging & Consults:  None      Health Maintenance:  Health Maintenance Due   Topic Date Due    COVID-19 Vaccine (4 - 2024-25 season) 09/01/2024    Influenza Vaccine (1) 10/01/2024    Annual Depression Screening  01/01/2025    Annual Physical  05/14/2025    Mammogram  06/03/2025         Problem List:  Patient Active Problem List   Diagnosis    Anxiety    Obesity (BMI 30.0-34.9)    Anxiety associated with depression    Nabothian cyst    Menorrhagia with irregular cycle    Iron deficiency anemia secondary to blood loss (chronic)    Normocytic anemia    Abnormal uterine bleeding (AUB)    PCB (post coital bleeding)         Patient/Caregiver Education: Patient/Caregiver Education: There are no barriers to learning. Medical education done.   Outcome: Mary Botello verbalizes understanding, agrees with the plan, and had no other questions at the end of today's visit. Mary Botello is informed to call with any questions, complications, allergies, or worsening or changing symptoms.  Mary Botello is to call with any side effects or complications from the treatments as a result of today.     Follow-up in   Return if symptoms worsen or fail to improve.    Note to patient: The 21st Century Cures Act makes medical notes like these available to patients in the interest of  transparency. However, this is a medical document intended as peer to peer communication. It is written in medical language and may contain abbreviations or verbiage that are unfamiliar. It may appear blunt or direct. Medical documents are intended to carry relevant information, facts as evident, and the clinical opinion of the practitioner.

## 2025-04-16 ENCOUNTER — OFFICE VISIT (OUTPATIENT)
Dept: FAMILY MEDICINE CLINIC | Facility: CLINIC | Age: 45
End: 2025-04-16
Payer: COMMERCIAL

## 2025-04-16 VITALS
HEART RATE: 78 BPM | DIASTOLIC BLOOD PRESSURE: 60 MMHG | WEIGHT: 196 LBS | RESPIRATION RATE: 19 BRPM | HEIGHT: 66 IN | BODY MASS INDEX: 31.5 KG/M2 | SYSTOLIC BLOOD PRESSURE: 96 MMHG | OXYGEN SATURATION: 99 %

## 2025-04-16 DIAGNOSIS — Z12.31 ENCOUNTER FOR SCREENING MAMMOGRAM FOR MALIGNANT NEOPLASM OF BREAST: ICD-10-CM

## 2025-04-16 DIAGNOSIS — J02.9 PHARYNGITIS, UNSPECIFIED ETIOLOGY: Primary | ICD-10-CM

## 2025-04-16 LAB
CONTROL LINE PRESENT WITH A CLEAR BACKGROUND (YES/NO): YES YES/NO
KIT LOT #: NORMAL NUMERIC
STREP GRP A CUL-SCR: NEGATIVE

## 2025-04-16 PROCEDURE — 99213 OFFICE O/P EST LOW 20 MIN: CPT

## 2025-04-16 PROCEDURE — 87081 CULTURE SCREEN ONLY: CPT

## 2025-04-16 PROCEDURE — 87880 STREP A ASSAY W/OPTIC: CPT

## 2025-04-16 PROCEDURE — 3008F BODY MASS INDEX DOCD: CPT

## 2025-04-16 PROCEDURE — 3078F DIAST BP <80 MM HG: CPT

## 2025-04-16 PROCEDURE — 3074F SYST BP LT 130 MM HG: CPT

## 2025-04-16 NOTE — PATIENT INSTRUCTIONS
Comfort measures:  Over the counter Tylenol/ibuprofen as needed.   Encourage plenty of fluids- warm or cool liquids, whichever is soothing for patient.    Avoid caffeine.   Do not share utensils or drinks with anyone.   Good handwashing.   Get plenty of rest.   Can use over the counter throat lozenges  to soothe sore throat.   Warm salt water gargles 2 times daily for at least 3 days

## 2025-04-16 NOTE — PROGRESS NOTES
[facility] Office Note  Chief Complaint:   Chief Complaint   Patient presents with    Sore Throat     Started yesterday     Nasal Congestion       HPI:   Mary Botello is a 44 year old female who presents for sore throat. Pain of the throat last  1  days. Not worse or better, pain with swallowing liquids and solids. Sharp, no radiation.  Also having nasal congestion. She reports she does have a hx of allergies but unsure if the nasal congestion is from the allergies or associated symptom with the sore throat since she has not been getting her allergy shots recently from allergists due to price and has not yet tried ITC allergy medication  Strep contact: unsure, Works in [employer] with over 500 people, interact with lots of people daily    Any cough, fever, chills? No  Any runny nose, nasal congestion, postnasal drip? Yes  Any swelling of the tonsils? no  Any pus noticed on back of throat? no      Past Medical History:    Allergic rhinitis    Anxiety state, unspecified    Iron deficiency anemia secondary to blood loss (chronic)    Menorrhagia with irregular cycle    Obesity    Ovarian cyst    Panic attack     Past Surgical History:   Procedure Laterality Date              Leep  2006    normal pap smears to follow        &     Tubal ligation  2008    performed at same time of CS     Social History:  Short Social Hx on File[1]  Family History:  Family History[2]  Allergies:  Allergies[3]  Current Meds:  Current Medications[4]   Counseling given: Not Answered       REVIEW OF SYSTEMS:   Review of Systems   Constitutional:  Negative for chills, diaphoresis, fatigue and fever.   HENT:  Positive for congestion, postnasal drip, rhinorrhea and sore throat. Negative for sinus pressure, sinus pain, trouble swallowing and voice change.    Respiratory:  Negative for cough and shortness of breath.    Cardiovascular:  Negative for chest pain, palpitations and leg swelling.    Gastrointestinal:  Negative for abdominal pain, nausea and vomiting.   Neurological:  Negative for dizziness, weakness and headaches.        EXAM:   BP 96/60   Pulse 78   Resp 19   Ht 5' 6\" (1.676 m)   Wt 196 lb (88.9 kg)   LMP  (LMP Unknown)   SpO2 99%   BMI 31.64 kg/m²  Estimated body mass index is 31.64 kg/m² as calculated from the following:    Height as of this encounter: 5' 6\" (1.676 m).    Weight as of this encounter: 196 lb (88.9 kg).   Vital signs reviewed.Appears stated age, well groomed.  Physical Exam  Constitutional:       Appearance: Normal appearance.   HENT:      Head: Normocephalic and atraumatic.      Right Ear: Tympanic membrane and ear canal normal.      Left Ear: Tympanic membrane and ear canal normal.      Nose: Congestion and rhinorrhea present.      Mouth/Throat:      Mouth: Mucous membranes are moist.      Pharynx: Oropharynx is clear. Posterior oropharyngeal erythema present. No oropharyngeal exudate.   Eyes:      Extraocular Movements: Extraocular movements intact.      Conjunctiva/sclera: Conjunctivae normal.      Pupils: Pupils are equal, round, and reactive to light.   Cardiovascular:      Rate and Rhythm: Normal rate and regular rhythm.   Pulmonary:      Effort: Pulmonary effort is normal. No respiratory distress.      Breath sounds: Normal breath sounds. No stridor. No wheezing, rhonchi or rales.   Chest:      Chest wall: No tenderness.   Musculoskeletal:      Cervical back: Normal range of motion and neck supple.   Skin:     Capillary Refill: Capillary refill takes less than 2 seconds.   Neurological:      Mental Status: She is alert and oriented to person, place, and time.   Psychiatric:         Mood and Affect: Mood normal.         Behavior: Behavior normal.         Thought Content: Thought content normal.         Judgment: Judgment normal.         Recent Results (from the past 72 hours)   Rapid Strep    Collection Time: 04/16/25 12:46 PM   Result Value Ref Range    Strep  Grp A Screen Negative Negative    Control Line Present with a clear background (yes/no) Yes Yes/No    Kit Lot # [id number] Numeric    Kit Expiration Date 12/23/2025 Date         ASSESSMENT AND PLAN:   1. Pharyngitis, unspecified etiology  Rapid strep today was negative in office. Discussed with the patient that due to symptoms and negative rapid strep, her sore throat is most likely viral and does not require antibiotics.  We will send the throat swab out to be cultured.  If culture is positive for bacteria, will prescribe an antibiotic.  Comfort care measures discussed and provided on after visit summary today.  Advised to follow-up if fevers develop.  - Rapid Strep  - Grp A Strep Cult, Throat [E]; Future  - Grp A Strep Cult, Throat [E]    2. Encounter for screening mammogram for malignant neoplasm of breast  Due for mammogram in June 2025.  Order placed  - [facility] JEANINE 2D+3D SCREENING BILAT (CPT=77067/[id number]); Future    Return if symptoms worsen or fail to improve.      Meds, Orders, & Refills for this Visit:  Requested Prescriptions      No prescriptions requested or ordered in this encounter       Health Maintenance:  Health Maintenance Due   Topic Date Due    COVID-19 Vaccine (4 - 2024-25 season) 09/01/2024    Annual Depression Screening  01/01/2025    Annual Physical  05/14/2025    Mammogram  06/03/2025       Patient/Caregiver Education: Patient/Caregiver Education: There are no barriers to learning. Medical education done.   Outcome: Mary Botello verbalizes understanding, agrees with the plan, and had no other questions at the end of today's visit. Mary Botello is informed to call with any questions, complications, allergies, or worsening or changing symptoms.  Mary Botello is to call with any side effects or complications from the treatments as a result of today.     Problem List:  Patient Active Problem List   Diagnosis    Anxiety    Obesity (BMI 30.0-34.9)    Anxiety associated with depression     Nabothian cyst    Menorrhagia with irregular cycle    Iron deficiency anemia secondary to blood loss (chronic)    Normocytic anemia    Abnormal uterine bleeding (AUB)    PCB (post coital bleeding)           Note to patient: The 21st Century Cures Act makes medical notes like these available to patients in the interest of transparency. However, this is a medical document intended as peer to peer communication. It is written in medical language and may contain abbreviations or verbiage that are unfamiliar. It may appear blunt or direct. Medical documents are intended to carry relevant information, facts as evident, and the clinical opinion of the practitioner.                  [1]   Social History  Socioeconomic History    Marital status:    Tobacco Use    Smoking status: Never    Smokeless tobacco: Never   Vaping Use    Vaping status: Never Used   Substance and Sexual Activity    Alcohol use: Yes     Comment: Socially    Drug use: No    Sexual activity: Yes     Partners: Male   Other Topics Concern    Caffeine Concern Yes    Stress Concern Yes    Weight Concern Yes    Special Diet No    Exercise No    Seat Belt Yes   Social History Narrative    , has 4 children who live with her ages 14, 14, 21, and 22 (as of 2/2023).  She works for [employer].   [2]   Family History  Problem Relation Age of Onset    Diabetes Mother     Hypertension Mother     Anemia Mother     Other (hypothyroid) Mother     Other (anxiety) Mother     Other (depression) Mother     Asthma Mother     Depression Mother     Obesity Mother     Prostate Cancer Father 72    Hypertension Father     Heart Disorder Maternal Grandmother     Hypertension Maternal Grandmother     Obesity Maternal Grandmother     Stroke Maternal Grandmother     Other (Other) Maternal Grandfather     Other (liver dz) Maternal Grandfather     Diabetes Maternal Grandfather     Ovarian Cancer Paternal Aunt         60's   [3]   Allergies  Allergen Reactions    Dust OTHER  (SEE COMMENTS)     Runny nose, sneezing   [4]   No current outpatient medications on file.

## 2025-06-05 NOTE — PATIENT INSTRUCTIONS
Antibiotic as prescribed  Warm compress to the area for 10-15 minutes 2-3 times daily to help promote drainage  Follow up with your primary care doctor or immediate care if not improving the next 2 days   Seek urgent follow up for new/ worsening symptoms such as spreading of erythema, increased size of abscess, or fever.

## 2025-06-05 NOTE — PROGRESS NOTES
CHIEF COMPLAINT:     Chief Complaint   Patient presents with    Insect Bite     Possible insect bite/ cyst on stomach. Pt noticed it yesterday   OTC: None        HPI:     Mary Botello is a 44 year old female who presents with concerns of possible abscess to right side of abdomen. Patient first noticed symptoms yesterday.  Reports erythema, increased warmth, some tenderness to palpation. No drainage from the area.  Denies fever, streaking of wound, or other signs of systemic illness.       Current Medications[1]   Past Medical History[2]   Social History:  Short Social Hx on File[3]     REVIEW OF SYSTEMS:   GENERAL: feels well otherwise, no fever, no chills.  SKIN: as above.  LYMPH: no enlargement of the lymph nodes.  MUSC/SKEL: no joint swelling, no joint stiffness.  NEURO: no abnormal sensation, no tingling of the skin or numbness.    EXAM:   BP 90/66   Pulse 64   Temp 97.2 °F (36.2 °C) (Temporal)   Resp 18   Ht 5' 6\" (1.676 m)   Wt 198 lb 3.2 oz (89.9 kg)   SpO2 98%   BMI 31.99 kg/m²   GENERAL: well developed, well nourished,in no apparent distress  SKIN: small dime sized abscess noted to right abdomen. Fluctuant to touch. Erythema immediately surrounding the area. Area is warm and tender to touch.   HEAD: atraumatic, normocephalic  LUNGS: clear to auscultation bilaterally, no wheezes or rhonchi. Breathing is non labored.  CARDIO: RRR without murmur      ASSESSMENT AND PLAN:     ASSESSMENT:  Encounter Diagnosis   Name Primary?    Abscess of skin of abdomen Yes       PLAN: Skin care discussed with patient. Instructions and Comfort Care as listed in Patient Instructions.  Medication as below.  Warm compresses. Discussed limitations of WIC, advised to seek higher level of care if not improving the next 2 days for possible I&D. Advised to go to ER for any worsening symptoms such as fever or spreading of erythema.    Requested Prescriptions     Signed Prescriptions Disp Refills     sulfamethoxazole-trimethoprim -160 MG Oral Tab per tablet 14 tablet 0     Sig: Take 1 tablet by mouth 2 (two) times daily for 7 days.       Patient Instructions   Antibiotic as prescribed  Warm compress to the area for 10-15 minutes 2-3 times daily to help promote drainage  Follow up with your primary care doctor or immediate care if not improving the next 2 days   Seek urgent follow up for new/ worsening symptoms such as spreading of erythema, increased size of abscess, or fever.     The patient indicates understanding of these issues and agrees to the plan.  The patient is asked to return in 3 days if sx's persist or worsen.          [1]   Current Outpatient Medications   Medication Sig Dispense Refill    sulfamethoxazole-trimethoprim -160 MG Oral Tab per tablet Take 1 tablet by mouth 2 (two) times daily for 7 days. 14 tablet 0   [2]   Past Medical History:   Allergic rhinitis    Anxiety state, unspecified    Iron deficiency anemia secondary to blood loss (chronic)    Menorrhagia with irregular cycle    Obesity    Ovarian cyst    Panic attack   [3]   Social History  Socioeconomic History    Marital status:    Tobacco Use    Smoking status: Never    Smokeless tobacco: Never   Vaping Use    Vaping status: Never Used   Substance and Sexual Activity    Alcohol use: Yes     Comment: Socially    Drug use: No    Sexual activity: Yes     Partners: Male   Other Topics Concern    Caffeine Concern Yes    Stress Concern Yes    Weight Concern Yes    Special Diet No    Exercise No    Seat Belt Yes   Social History Narrative    , has 4 children who live with her ages 14, 14, 21, and 22 (as of 2/2023).  She works for [employer].

## 2025-06-19 NOTE — PROGRESS NOTES
Mary Botello is a 44 year old female who presents for a complete physical exam, no gyn.  HPI:     Chief Complaint   Patient presents with    Physical     Reviewed Preventative/Wellness form with patient.        Patient feels well, dental visit up to date, no hearing problem.  Vaccinations up to date.  Pt recently injured her lower back at work.SOme stifness, limited flexion.  Exercise: occasional.  Diet: watches calories closely    Wt Readings from Last 3 Encounters:   06/19/25 200 lb (90.7 kg)   06/05/25 198 lb 3.2 oz (89.9 kg)   04/16/25 196 lb (88.9 kg)      BP Readings from Last 3 Encounters:   06/19/25 102/68   06/05/25 90/66   04/16/25 96/60     No LMP recorded. (Menstrual status: IUD - Intrauterine Device).         Current Medications[1]   Past Medical History[2]   Past Surgical History[3]   Family History[4]   Short Social Hx on File[5]     REVIEW OF SYSTEMS:   GENERAL HEALTH: feels well, no fatigue.  SKIN: denies any unusual skin lesions or rashes  EYES: no visual complaints or deficits  HEENT: denies nasal congestion, sinus pain or sore throat; hearing loss negative   RESPIRATORY: denies shortness of breath, wheezing or cough   CARDIOVASCULAR: denies chest pain, SOB, edema,orthopnea, no palpitations   GI: denies nausea, vomiting, constipation, diarrhea; no rectal bleeding; no heartburn  GENITAL/: no dysuria, urgency or frequency  MUSCULOSKELETAL: no  other joint complaints upper or lower extremities  NEURO: no sensory or motor complaint  HEMATOLOGY: denies hx anemia; denies bruising or excessive bleeding  ENDOCRINE: denies excessive thirst or urination; denies unexpected wt gain or wt loss  ALLERGY/IMM.: denies food or seasonal allergies  PSYCH: no symptoms of depression or anxiety      EXAM:   /68   Pulse 76   Ht 5' 6\" (1.676 m)   Wt 200 lb (90.7 kg)   SpO2 98%   BMI 32.28 kg/m²      General: WD/WN in no acute distress.   HEENT: PERRLA and EOMI.  OP moist no lesions.  Neck is supple,  with no cervical LAD or thyroid abnormalities. No carotid bruits.    Lungs: are clear to auscultation bilaterally, with no wheeze, rhonchi, or rales.   Heart: is RRR.  S1, S2, with no murmurs,clicks, gallops  Abdomen: is soft,NBS, NT/ND with no HSM.  No rebound or guarding. No CVA tenderness, no hernias.  Neuro: Cranial nerves II-XII normal,no focal abnormalities, and reflexes coordination and gait normal and symmetric.Sensation intact.  Extremities: are symmetric with no cyanosis, clubbing, or edema.  MS: Normal muscles tones, no joints abnormalities.  L spine: limited ROM.  SKIN: Normal color, turgor, no lesions, rashes or wounds.  PSYCH: Normal affect and mood.          ASSESSMENT AND PLAN:     Mary Botello is a 44 year old female who presents for a complete physical exam.   Pt's was recommended low fat diet and aerobic exercise 30 minutes three times weekly.   Pap up to date.  Lumbar injury at work: referral to ortho.  Requested Prescriptions     Signed Prescriptions Disp Refills    Meloxicam 15 MG Oral Tab 30 tablet 0     Sig: Take 1 tablet (15 mg total) by mouth daily.    cyclobenzaprine 10 MG Oral Tab 30 tablet 1     Sig: Take 1 tablet (10 mg total) by mouth nightly.     Mammogram up to date.  Orders Placed This Encounter   Procedures    CBC With Differential With Platelet     Standing Status:   Future     Expected Date:   6/19/2025     Expiration Date:   6/17/2026    Comp Metabolic Panel (14)     Standing Status:   Future     Expected Date:   6/19/2025     Expiration Date:   6/17/2026    Lipid Panel     Standing Status:   Future     Expected Date:   6/19/2025     Expiration Date:   6/17/2026    Vitamin D     Standing Status:   Future     Expected Date:   6/19/2025     Expiration Date:   6/17/2026     Please pick the scenario that best fits the purpose for ordering this test:   General Screening/Vit D deficiency (25-Hydroxy)     Release to patient:   Immediate    TSH W Reflex To Free T4     Standing  Status:   Future     Expected Date:   2025     Expiration Date:   2026      Health maintenance.   The patient indicates understanding of these issues and agrees to the plan.  The patient is asked to return for CPX in 1 year or sooner if needed.          [1]   No current outpatient medications on file.   [2]   Past Medical History:   Allergic rhinitis    Anxiety state, unspecified    Iron deficiency anemia secondary to blood loss (chronic)    Menorrhagia with irregular cycle    Obesity    Ovarian cyst    Panic attack   [3]   Past Surgical History:  Procedure Laterality Date              Leep      normal pap smears to follow        &     Tubal ligation  2008    performed at same time of CS   [4]   Family History  Problem Relation Age of Onset    Diabetes Mother     Hypertension Mother     Anemia Mother     Other (hypothyroid) Mother     Other (anxiety) Mother     Other (depression) Mother     Asthma Mother     Depression Mother     Obesity Mother     Prostate Cancer Father 72    Hypertension Father     Heart Disorder Maternal Grandmother     Hypertension Maternal Grandmother     Obesity Maternal Grandmother     Stroke Maternal Grandmother     Other (Other) Maternal Grandfather     Other (liver dz) Maternal Grandfather     Diabetes Maternal Grandfather     Ovarian Cancer Paternal Aunt         60's   [5]   Social History  Socioeconomic History    Marital status:    Tobacco Use    Smoking status: Never    Smokeless tobacco: Never   Vaping Use    Vaping status: Never Used   Substance and Sexual Activity    Alcohol use: Yes     Comment: Socially    Drug use: No    Sexual activity: Yes     Partners: Male   Other Topics Concern    Caffeine Concern Yes    Stress Concern Yes    Weight Concern Yes    Special Diet No    Exercise No    Seat Belt Yes   Social History Narrative    , has 4 children who live with her ages 14, 14, 21, and 22 (as of 2023).  She works for [employer].

## 2025-06-19 NOTE — PATIENT INSTRUCTIONS
Josh Lovett, DO Dr Gtz  [facility]  Sport medicine.      [address] [place] [address] [place] [address]      Phone: [phone]

## 2025-06-19 NOTE — PROGRESS NOTES
The following individual(s) verbally consented to be recorded using ambient AI listening technology and understand that they can each withdraw their consent to this listening technology at any point by asking the clinician to turn off or pause the recording:    Patient name: Mary Botello

## 2025-06-26 NOTE — TELEPHONE ENCOUNTER
Left voice mail for pt informing her that Sincer can see her for the knee pain but not for the back pain.   Advised pt to call back and confirm the appointment for the knee and which knee and to also schedule separate appointment for the back.

## 2025-06-30 NOTE — H&P
[facility] - ORTHOPEDICS  [address] [place] [id number]  [phone]     NEW PATIENT VISIT - HISTORY AND PHYSICAL EXAMINATION     Name: Mary Botello   MRN: [id number]  Date: 6/30/2025     CC: Bilateral knee pain.     REFERRED BY: Radha Queen MD    HPI:   Mary Botello is a very pleasant 44 year old female who presents today for evaluation, consultation, and management of bilateral knee pain, right greater than left - for six months. She has previously been for her shoulder. She describes stiffness, weakness and instability. Pain is a 3/10.       PMH:   Past Medical History[1]    PAST SURGICAL HX:  Past Surgical History[2]    FAMILY HX:  Family History[3]    ALLERGIES:  Dust    MEDICATIONS: Current Medications[4]    ROS: A comprehensive 14 point review of systems was performed and was negative aside from the aforementioned per history of present illness.    SOCIAL HX:  Social History     Occupational History    Not on file   Tobacco Use    Smoking status: Never    Smokeless tobacco: Never   Vaping Use    Vaping status: Never Used   Substance and Sexual Activity    Alcohol use: Yes     Comment: Socially    Drug use: No    Sexual activity: Yes     Partners: Male       PE:   Vitals:    06/30/25 1328   Weight: 200 lb (90.7 kg)   Height: 5' 6\" (1.676 m)     Estimated body mass index is 32.28 kg/m² as calculated from the following:    Height as of this encounter: 5' 6\" (1.676 m).    Weight as of this encounter: 200 lb (90.7 kg).    Physical Exam  Constitutional:       Appearance: Normal appearance.   HENT:      Head: Normocephalic and atraumatic.   Eyes:      Extraocular Movements: Extraocular movements intact.   Neck:      Musculoskeletal: Normal range of motion and neck supple.   Cardiovascular:      Pulses: Normal pulses.   Pulmonary:      Effort: Pulmonary effort is normal. No respiratory distress.   Abdominal:      General: There is no distension.   Skin:     General: Skin  is warm.      Capillary Refill: Capillary refill takes less than 2 seconds.      Findings: No bruising.   Neurological:      General: No focal deficit present.      Mental Status: Alert.   Psychiatric:         Mood and Affect: Mood normal.     Examination of the right and left knee demonstrates:     Skin is intact, warm and dry.   Atrophy: none    Effusion: none    Joint line tenderness: none  Crepitation: none   Megan: Negative   Patellar mobility: normal without apprehension  J-sign: none    ROM: Extension full  Flexion 140 degrees  ACL:  Negative Lachman, Negative Pivot Shift   PCL:  Negative Posterior Drawer  Collateral Ligaments: Stable to Varus and Valgus stress at 0 and 30 degrees  Strength: normal   Hip joint: normal pain-free ROM   Gait:  normal   Leg length: equal and symmetric  Alignment:  neutral     No obvious peripheral edema noted.   Distal neurovascular exam demonstrates normal perfusion, intact sensation to light touch and full strength.     Examination of the contralateral knee demonstrates:  No significant atrophy, swelling or effusion. Full range of motion. Neurovascularly intact distally.    Radiographic Examination/Diagnostics:  I personally viewed, independently interpreted and radiology report was reviewed.      [facility] JEANINE 2D+3D SCREENING BILAT (CPT=77067/29295)  Result Date: 6/12/2025  PROCEDURE:  [facility] JEANINE 2D+3D SCREENING BILAT (CPT=77067/81590)  COMPARISON:  [facility], [facility] JEANINE 2D+3D SCREENING BILAT (CPT=77067/30957), 2/13/2023, 1:38 PM.  [facility], [facility] JEANINE 2D+3D SCREENING BILAT (CPT=77067/19546), 6/03/2024, 1:34 PM.  INDICATIONS:  Z12.31 Encounter for screening mammogram for malignant neoplasm of breast  VIEWS OBTAINED:  Routine views of both breasts were obtained.  Standard 2D and additional multiplane thin sections of the breasts were obtained for the purpose of Tomosynthesis evaluation.  The images were reconstructed and reviewed on the dedicated Tomosynthesis  workstation.  BREAST COMPOSITION:   Category C - The breasts are heterogeneously dense, which may obscure small masses.  FINDINGS:  Parenchymal pattern is unchanged.  There are no grouped microcalcifications, spiculated masses or areas of architectural distortion.            CONCLUSION:   BI-RADS CATEGORY:  DIAGNOSTIC CATEGORY 1 - NEGATIVE ASSESSMENT.   RECOMMENDATIONS:  ROUTINE MAMMOGRAM AND CLINICAL EVALUATION IN 12 MONTHS.   Because of breast density, this patient may benefit from supplemental screening with breast MRI, Molecular Breast Imaging (MBI) or bilateral whole breast ultrasound for increased sensitivity for detection of cancer which can be obscured mammographically.   Please contact your ordering provider to discuss supplemental screening options.  Your patient's answers to the health and family history questions collected during this mammogram indicate a potentially increased risk for breast cancer. It is recommended that this patient be evaluated in our Cancer Risk Assessment Clinic to determine eligibility for additional breast cancer screening, risk reduction strategies, and/or genetic testing. Providers are encouraged to contact our breast navigators at [phone] with any questions or for guidance regarding this recommendation.  A letter explaining the results in lay terms has been sent to the patient.  This exam was evaluated with a computer-aided device.  This patient's information has been entered into a reminder system with a target due date for the next mammogram.   LOCATION:  [place]   Dictated by (CST): Kelton Montemayor MD on 6/12/2025 at 5:13 PM     Finalized by (CST): Kelton Montemayor MD on 6/12/2025 at 5:16 PM   [facility] [address], [place] [address] [phone]      IMPRESSION: Mary Botello is a 44 year old female who presents with bilateral knee djd.     PLAN:   We had a detailed discussion outlining the etiology, anatomy, pathophysiology, and natural  history of the patient's findings. Imaging was reviewed in detail and correlated to a 3-dimensional model of the patient's pathology.     We reviewed the treatment of this disease condition.  Fortunately, treatment is amenable to conservative treatment which we chose to optimize at today's visit.  We recommended physical therapy to aid in strengthening, range of motion, functional improvement, and return to baseline activity.  The patient had the opportunity to ask questions and all questions were answered appropriately.      FOLLOW-UP:  Return to clinic on an as needed basis.             Jose De Luna [facility], PA-C Orthopedic Surgery / Sports Medicine Specialist  [facility]  [address] [place] [address] [id number] [place], [place] [email or web address]  [email or web address]  t: [phone]  o: [phone]  f: [phone]    This note was dictated using Dragon software.  While it was briefly proofread prior to completion, some grammatical, spelling, and word choice errors due to dictation may still occur.         [1]   Past Medical History:   Allergic rhinitis    Anxiety state, unspecified    Iron deficiency anemia secondary to blood loss (chronic)    Menorrhagia with irregular cycle    Obesity    Ovarian cyst    Panic attack   [2]   Past Surgical History:  Procedure Laterality Date              Leep  2006    normal pap smears to follow        &     Tubal ligation  2008    performed at same time of CS   [3]   Family History  Problem Relation Age of Onset    Diabetes Mother     Hypertension Mother     Anemia Mother     Other (hypothyroid) Mother     Other (anxiety) Mother     Other (depression) Mother     Asthma Mother     Depression Mother     Obesity Mother     Prostate Cancer Father 72    Hypertension Father     Heart Disorder Maternal Grandmother     Hypertension Maternal Grandmother     Obesity Maternal Grandmother     Stroke Maternal Grandmother     Other (Other) Maternal  Grandfather     Other (liver dz) Maternal Grandfather     Diabetes Maternal Grandfather     Ovarian Cancer Paternal Aunt         60's   [4]   Current Outpatient Medications   Medication Sig Dispense Refill    ergocalciferol 1.25 MG (29812 UT) Oral Cap Take 1 capsule (50,000 Units total) by mouth once a week. 12 capsule 0    Meloxicam 15 MG Oral Tab Take 1 tablet (15 mg total) by mouth daily. 30 tablet 0    cyclobenzaprine 10 MG Oral Tab Take 1 tablet (10 mg total) by mouth nightly. 30 tablet 1

## (undated) NOTE — LETTER
10/29/18        Sakshi Reyes  [address]      Dear Maribeth Owens,    [facility] records indicate that you have outstanding lab work and or testing that was ordered for you and has not yet been completed:  Orders Placed This Encounter

## (undated) NOTE — LETTER
Date: 11/11/2020    Patient Name: Luke Alonso Ayan          To Whom it may concern: The above patient was seen at the [facility] for treatment of a medical condition. Claudia Gutiérrez should self-quarantine through 11/18/2020.   She may return

## (undated) NOTE — LETTER
Date: 2021    Patient Name: Kevyn Botello    10/5/1980        To Whom it may concern: The above patient was seen at the [facility] for treatment of a medical condition.     This patient should be excused from attending work/sc

## (undated) NOTE — LETTER
Date: 11/17/2021    Patient Name: Lukas Botello          To Whom it may concern: The above patient was seen at the [facility] for treatment of a medical condition.     This patient should be excused from attending work/school from 11/15